# Patient Record
Sex: MALE | Race: WHITE | Employment: FULL TIME | URBAN - METROPOLITAN AREA
[De-identification: names, ages, dates, MRNs, and addresses within clinical notes are randomized per-mention and may not be internally consistent; named-entity substitution may affect disease eponyms.]

---

## 2018-06-06 DIAGNOSIS — I25.10 CORONARY ARTERY DISEASE INVOLVING NATIVE CORONARY ARTERY OF NATIVE HEART WITHOUT ANGINA PECTORIS: Chronic | ICD-10-CM

## 2018-06-06 DIAGNOSIS — Z95.5 HISTORY OF CORONARY ARTERY STENT PLACEMENT: ICD-10-CM

## 2018-06-06 RX ORDER — METOPROLOL SUCCINATE 25 MG/1
25 TABLET, EXTENDED RELEASE ORAL DAILY
Qty: 90 TABLET | Refills: 3 | Status: SHIPPED | OUTPATIENT
Start: 2018-06-06 | End: 2019-06-08 | Stop reason: SDUPTHER

## 2018-07-22 ENCOUNTER — APPOINTMENT (OUTPATIENT)
Dept: ULTRASOUND IMAGING | Age: 63
DRG: 312 | End: 2018-07-22
Payer: COMMERCIAL

## 2018-07-22 ENCOUNTER — HOSPITAL ENCOUNTER (INPATIENT)
Age: 63
LOS: 1 days | Discharge: HOME | DRG: 312 | End: 2018-07-23
Attending: INTERNAL MEDICINE | Admitting: INTERNAL MEDICINE
Payer: COMMERCIAL

## 2018-07-22 ENCOUNTER — APPOINTMENT (OUTPATIENT)
Dept: CT IMAGING | Age: 63
DRG: 312 | End: 2018-07-22
Payer: COMMERCIAL

## 2018-07-22 DIAGNOSIS — R55 SYNCOPE AND COLLAPSE: Primary | ICD-10-CM

## 2018-07-22 LAB
ANION GAP SERPL CALCULATED.3IONS-SCNC: 11 MMOL/L (ref 7–16)
BASOPHILS ABSOLUTE: 0.04 E9/L (ref 0–0.2)
BASOPHILS RELATIVE PERCENT: 0.3 % (ref 0–2)
BILIRUBIN URINE: NEGATIVE
BLOOD, URINE: NEGATIVE
BUN BLDV-MCNC: 15 MG/DL (ref 8–23)
CALCIUM SERPL-MCNC: 8.8 MG/DL (ref 8.6–10.2)
CHLORIDE BLD-SCNC: 102 MMOL/L (ref 98–107)
CLARITY: CLEAR
CO2: 26 MMOL/L (ref 22–29)
COLOR: YELLOW
CREAT SERPL-MCNC: 0.9 MG/DL (ref 0.7–1.2)
EOSINOPHILS ABSOLUTE: 0.12 E9/L (ref 0.05–0.5)
EOSINOPHILS RELATIVE PERCENT: 1 % (ref 0–6)
GFR AFRICAN AMERICAN: >60
GFR NON-AFRICAN AMERICAN: >60 ML/MIN/1.73
GLUCOSE BLD-MCNC: 121 MG/DL (ref 74–109)
GLUCOSE URINE: NEGATIVE MG/DL
HCT VFR BLD CALC: 41.3 % (ref 37–54)
HEMOGLOBIN: 14.2 G/DL (ref 12.5–16.5)
IMMATURE GRANULOCYTES #: 0.05 E9/L
IMMATURE GRANULOCYTES %: 0.4 % (ref 0–5)
KETONES, URINE: NEGATIVE MG/DL
LACTIC ACID: 1 MMOL/L (ref 0.5–2.2)
LEUKOCYTE ESTERASE, URINE: NEGATIVE
LYMPHOCYTES ABSOLUTE: 0.89 E9/L (ref 1.5–4)
LYMPHOCYTES RELATIVE PERCENT: 7.4 % (ref 20–42)
MCH RBC QN AUTO: 29.9 PG (ref 26–35)
MCHC RBC AUTO-ENTMCNC: 34.4 % (ref 32–34.5)
MCV RBC AUTO: 86.9 FL (ref 80–99.9)
MONOCYTES ABSOLUTE: 0.76 E9/L (ref 0.1–0.95)
MONOCYTES RELATIVE PERCENT: 6.4 % (ref 2–12)
NEUTROPHILS ABSOLUTE: 10.1 E9/L (ref 1.8–7.3)
NEUTROPHILS RELATIVE PERCENT: 84.5 % (ref 43–80)
NITRITE, URINE: NEGATIVE
PDW BLD-RTO: 13.6 FL (ref 11.5–15)
PH UA: 7 (ref 5–9)
PLATELET # BLD: 315 E9/L (ref 130–450)
PMV BLD AUTO: 10.2 FL (ref 7–12)
POTASSIUM REFLEX MAGNESIUM: 4.3 MMOL/L (ref 3.5–5)
PROTEIN UA: NEGATIVE MG/DL
RBC # BLD: 4.75 E12/L (ref 3.8–5.8)
SODIUM BLD-SCNC: 139 MMOL/L (ref 132–146)
SPECIFIC GRAVITY UA: 1.01 (ref 1–1.03)
TROPONIN: <0.01 NG/ML (ref 0–0.03)
UROBILINOGEN, URINE: 0.2 E.U./DL
WBC # BLD: 12 E9/L (ref 4.5–11.5)

## 2018-07-22 PROCEDURE — 99285 EMERGENCY DEPT VISIT HI MDM: CPT

## 2018-07-22 PROCEDURE — 6360000002 HC RX W HCPCS: Performed by: INTERNAL MEDICINE

## 2018-07-22 PROCEDURE — 80048 BASIC METABOLIC PNL TOTAL CA: CPT

## 2018-07-22 PROCEDURE — 85025 COMPLETE CBC W/AUTO DIFF WBC: CPT

## 2018-07-22 PROCEDURE — 2580000003 HC RX 258: Performed by: INTERNAL MEDICINE

## 2018-07-22 PROCEDURE — 83605 ASSAY OF LACTIC ACID: CPT

## 2018-07-22 PROCEDURE — 2060000000 HC ICU INTERMEDIATE R&B

## 2018-07-22 PROCEDURE — 70450 CT HEAD/BRAIN W/O DYE: CPT

## 2018-07-22 PROCEDURE — 74176 CT ABD & PELVIS W/O CONTRAST: CPT

## 2018-07-22 PROCEDURE — 81003 URINALYSIS AUTO W/O SCOPE: CPT

## 2018-07-22 PROCEDURE — 93880 EXTRACRANIAL BILAT STUDY: CPT

## 2018-07-22 PROCEDURE — 2580000003 HC RX 258: Performed by: EMERGENCY MEDICINE

## 2018-07-22 PROCEDURE — 84484 ASSAY OF TROPONIN QUANT: CPT

## 2018-07-22 RX ORDER — SODIUM CHLORIDE 0.9 % (FLUSH) 0.9 %
10 SYRINGE (ML) INJECTION EVERY 12 HOURS SCHEDULED
Status: DISCONTINUED | OUTPATIENT
Start: 2018-07-22 | End: 2018-07-22 | Stop reason: SDUPTHER

## 2018-07-22 RX ORDER — SODIUM CHLORIDE 0.9 % (FLUSH) 0.9 %
10 SYRINGE (ML) INJECTION PRN
Status: DISCONTINUED | OUTPATIENT
Start: 2018-07-22 | End: 2018-07-23 | Stop reason: HOSPADM

## 2018-07-22 RX ORDER — ONDANSETRON 2 MG/ML
4 INJECTION INTRAMUSCULAR; INTRAVENOUS EVERY 6 HOURS PRN
Status: DISCONTINUED | OUTPATIENT
Start: 2018-07-22 | End: 2018-07-23 | Stop reason: HOSPADM

## 2018-07-22 RX ORDER — 0.9 % SODIUM CHLORIDE 0.9 %
1000 INTRAVENOUS SOLUTION INTRAVENOUS ONCE
Status: COMPLETED | OUTPATIENT
Start: 2018-07-22 | End: 2018-07-22

## 2018-07-22 RX ORDER — ACETAMINOPHEN 325 MG/1
650 TABLET ORAL EVERY 4 HOURS PRN
Status: DISCONTINUED | OUTPATIENT
Start: 2018-07-22 | End: 2018-07-23 | Stop reason: HOSPADM

## 2018-07-22 RX ORDER — ACETAMINOPHEN 325 MG/1
650 TABLET ORAL EVERY 4 HOURS PRN
Status: DISCONTINUED | OUTPATIENT
Start: 2018-07-22 | End: 2018-07-22 | Stop reason: SDUPTHER

## 2018-07-22 RX ORDER — SODIUM CHLORIDE 0.9 % (FLUSH) 0.9 %
10 SYRINGE (ML) INJECTION PRN
Status: DISCONTINUED | OUTPATIENT
Start: 2018-07-22 | End: 2018-07-22 | Stop reason: SDUPTHER

## 2018-07-22 RX ORDER — SODIUM CHLORIDE 9 MG/ML
INJECTION, SOLUTION INTRAVENOUS CONTINUOUS
Status: DISCONTINUED | OUTPATIENT
Start: 2018-07-22 | End: 2018-07-23

## 2018-07-22 RX ORDER — OXYCODONE AND ACETAMINOPHEN 10; 325 MG/1; MG/1
1 TABLET ORAL EVERY 6 HOURS PRN
Status: DISCONTINUED | OUTPATIENT
Start: 2018-07-22 | End: 2018-07-23 | Stop reason: HOSPADM

## 2018-07-22 RX ORDER — ASPIRIN 81 MG/1
81 TABLET, CHEWABLE ORAL DAILY
Status: DISCONTINUED | OUTPATIENT
Start: 2018-07-23 | End: 2018-07-23 | Stop reason: HOSPADM

## 2018-07-22 RX ORDER — METOPROLOL SUCCINATE 25 MG/1
25 TABLET, EXTENDED RELEASE ORAL DAILY
Status: DISCONTINUED | OUTPATIENT
Start: 2018-07-23 | End: 2018-07-23 | Stop reason: HOSPADM

## 2018-07-22 RX ORDER — SODIUM CHLORIDE 0.9 % (FLUSH) 0.9 %
10 SYRINGE (ML) INJECTION EVERY 12 HOURS SCHEDULED
Status: DISCONTINUED | OUTPATIENT
Start: 2018-07-22 | End: 2018-07-23 | Stop reason: HOSPADM

## 2018-07-22 RX ADMIN — Medication 10 ML: at 22:21

## 2018-07-22 RX ADMIN — SODIUM CHLORIDE 1000 ML: 9 INJECTION, SOLUTION INTRAVENOUS at 14:47

## 2018-07-22 RX ADMIN — SODIUM CHLORIDE: 9 INJECTION, SOLUTION INTRAVENOUS at 22:20

## 2018-07-22 RX ADMIN — ENOXAPARIN SODIUM 40 MG: 40 INJECTION, SOLUTION INTRAVENOUS; SUBCUTANEOUS at 22:20

## 2018-07-22 ASSESSMENT — ENCOUNTER SYMPTOMS
COUGH: 0
SINUS PAIN: 0
VISUAL CHANGE: 0
BACK PAIN: 1
SINUS PRESSURE: 0
DIFFICULTY BREATHING: 0
VOMITING: 0
SHORTNESS OF BREATH: 0
RECTAL BLEEDING: 0
NAUSEA: 0

## 2018-07-22 ASSESSMENT — PAIN SCALES - GENERAL: PAINLEVEL_OUTOF10: 0

## 2018-07-22 NOTE — ED PROVIDER NOTES
urinating. Musculoskeletal: Positive for back pain. Negative for neck pain and neck stiffness. Skin: Negative for pallor. Neurological: Positive for syncope, light-headedness and headaches. Negative for focal weakness, seizures and speech difficulty. Psychiatric/Behavioral: Negative for confusion and decreased concentration. Physical Exam   Constitutional: He is oriented to person, place, and time. He appears well-developed and well-nourished. No distress. HENT:   Head: Normocephalic and atraumatic. Right Ear: External ear normal.   Left Ear: External ear normal.   Eyes: Conjunctivae are normal. Pupils are equal, round, and reactive to light. Neck: Neck supple. Cardiovascular: Normal rate, regular rhythm and intact distal pulses. Murmur heard. Pulses:       Radial pulses are 2+ on the right side, and 2+ on the left side. Dorsalis pedis pulses are 2+ on the right side, and 2+ on the left side. Pulmonary/Chest: Effort normal and breath sounds normal. No respiratory distress. He has no wheezes. Abdominal: Soft. Bowel sounds are normal. There is no tenderness. Musculoskeletal: He exhibits no edema. Neurological: He is alert and oriented to person, place, and time. GCS eye subscore is 4. GCS verbal subscore is 5. GCS motor subscore is 6. Patient is equal  strength. Patient able to perform elbow flexion and extension against resistance. Patient able to perform hip flexion, knee extension, plantar flexion, dorsiflexion and extension for hallicus longus muscle against resistance. She denies any numbness or tingling sensation on all for tremors. Skin: Skin is warm and dry. He is not diaphoretic. Psychiatric: He has a normal mood and affect. Nursing note and vitals reviewed. Procedures    MDM  Number of Diagnoses or Management Options  Syncope and collapse:   Diagnosis management comments: Patient presents to the ED for syncopal episode.  The patient was evaluated for intracranial process, dysrhythmia and electrolyte site deficits. EKG and blood work unremarkable. CT scan of the head unremarkable. CT scan of the abdomen did not demonstrate an AAA. Patient's headache subsided prior to arrival due to his intermittent description of the headache, we believe is less likely a subarachnoid hemorrhage. Workup in the ED revealed unknown etiology for patient's syncope and collapse. With this in mind as well as given his TIA and cardiac history, he warrants further evaluation and management with admission. Patient requires continued workup and management of their symptoms and will be admitted to the hospital for further evaluation and treatment.          --------------------------------------------- PAST HISTORY ---------------------------------------------  Past Medical History:  has a past medical history of CAD (coronary artery disease); Coronary artery disease; Hyperlipidemia LDL goal < 100; and Pulmonary asbestosis ([facility] [address] Past Surgical History:  has a past surgical history that includes knee surgery (years ago); shoulder surgery (years ago); back surgery; and Cardiac catheterization (8/6/2013). Social History:  reports that he has never smoked. He uses smokeless tobacco. He reports that he drinks about 2.4 oz of alcohol per week . He reports that he does not use drugs. Family History: family history includes Heart Disease (age of onset: 54) in his father. The patients home medications have been reviewed. Allergies: Patient has no known allergies.     -------------------------------------------------- RESULTS -------------------------------------------------    Lab  Results for orders placed or performed during the hospital encounter of 07/22/18   CBC auto differential   Result Value Ref Range    WBC 12.0 (H) 4.5 - 11.5 E9/L    RBC 4.75 3.80 - 5.80 E12/L    Hemoglobin 14.2 12.5 - 16.5 g/dL    Hematocrit 41.3 37.0 - 54.0 %    MCV 86.9 80.0 - 99.9 fL    MCH 29.9 26.0 -

## 2018-07-22 NOTE — ED NOTES
Pt SBAR faxed; Floor contacted to verify receipt of faxed transmission.  Pt ready for transport     Sarahy Johansen RN  07/22/18 8721

## 2018-07-23 VITALS
BODY MASS INDEX: 32.07 KG/M2 | HEART RATE: 76 BPM | TEMPERATURE: 97.7 F | WEIGHT: 236.8 LBS | OXYGEN SATURATION: 95 % | SYSTOLIC BLOOD PRESSURE: 150 MMHG | RESPIRATION RATE: 14 BRPM | DIASTOLIC BLOOD PRESSURE: 81 MMHG | HEIGHT: 72 IN

## 2018-07-23 DIAGNOSIS — R55 SYNCOPE, UNSPECIFIED SYNCOPE TYPE: Primary | ICD-10-CM

## 2018-07-23 LAB
LV EF: 65 %
LVEF MODALITY: NORMAL

## 2018-07-23 PROCEDURE — 93306 TTE W/DOPPLER COMPLETE: CPT

## 2018-07-23 PROCEDURE — 6370000000 HC RX 637 (ALT 250 FOR IP): Performed by: INTERNAL MEDICINE

## 2018-07-23 PROCEDURE — 99254 IP/OBS CNSLTJ NEW/EST MOD 60: CPT | Performed by: INTERNAL MEDICINE

## 2018-07-23 RX ADMIN — ACETAMINOPHEN 650 MG: 325 TABLET ORAL at 11:04

## 2018-07-23 RX ADMIN — METOPROLOL SUCCINATE 25 MG: 25 TABLET, EXTENDED RELEASE ORAL at 08:16

## 2018-07-23 RX ADMIN — ASPIRIN 81 MG CHEWABLE TABLET 81 MG: 81 TABLET CHEWABLE at 08:16

## 2018-07-23 ASSESSMENT — PAIN SCALES - GENERAL: PAINLEVEL_OUTOF10: 3

## 2018-07-23 NOTE — H&P
pain    Allergies:    Patient has no known allergies. Social History:    reports that he has never smoked. His smokeless tobacco use includes Chew. He reports that he drinks about 2.4 oz of alcohol per week . He reports that he does not use drugs. Family History:   family history includes Heart Disease (age of onset: 54) in his father. REVIEW OF SYSTEMS    Constitutional: negative for chills and fevers  Eyes: negative for icterus and redness  Ears, nose, mouth, throat, and face: negative for epistaxis, hearing loss, nasal congestion, sore mouth, sore throat and tinnitus  Respiratory: negative for cough and shortness of breath  Cardiovascular: negative for chest pain and palpitations  Gastrointestinal: negative for abdominal pain, nausea and vomiting  Genitourinary:negative for dysuria and frequency  Integument/breast: negative for pruritus and rash  Hematologic/lymphatic: negative for bleeding and easy bruising  Musculoskeletal:negative for arthralgias and back pain  Neurological: negative for coordination problems and dizziness  Behavioral/Psych: negative for anxiety and depression  Endocrine: negative for temperature intolerance  Allergic/Immunologic: negative for anaphylaxis and angioedema    PHYSICAL EXAM:    Vitals:  BP (!) 150/81   Pulse 76   Temp 97.7 °F (36.5 °C) (Oral)   Resp 14   Ht 6' (1.829 m)   Wt 236 lb 12.8 oz (107.4 kg)   SpO2 95%   BMI 32.12 kg/m²     General appearance: alert, appears stated age and cooperative  Head: Normocephalic, without obvious abnormality, atraumatic  Eyes: conjunctivae/corneas clear. PERRL, EOM's intact. Fundi benign. Ears: normal TM's and external ear canals both ears  Nose: Nares normal. Septum midline. Mucosa normal. No drainage or sinus tenderness.   Throat: lips, mucosa, and tongue normal; teeth and gums normal  Neck: no adenopathy, no carotid bruit, no JVD, supple, symmetrical, trachea midline and thyroid not enlarged, symmetric, no Nitrite, Urine Latest Ref Range: Negative  Negative   Leukocyte Esterase, Urine Latest Ref Range: Negative  Negative   Results for Kymberly Lisa (MRN [id number]) as of 2018 11:18   Ref. Range 2018 15:16   Lactic Acid Latest Ref Range: 0.5 - 2.2 mmol/L 1.0     Narrative   Patient MRN:  [id number]   : 1955   Age: 58 years   Gender: Male   Order Date:  2018 2:45 PM       EXAM: CT ABDOMEN PELVIS WO CONTRAST       NUMBER OF IMAGES \ views:       INDICATION: No abdominal complaints, screening for abdominal aortic   aneurysm in patient with coronary artery disease and hyperlipidemia       COMPARISON: None       TECHNIQUE:   Axial computerized tomography sections of the abdomen with sagittal   and coronal MPR reconstructions were obtained from the lower chest to   the pelvic floor. Reformatted coronal and sagittal images were   provided for review.  Low-dose CT  acquisition technique included one   of following options; 1 . Automated exposure control, 2. Adjustment of   MA and or KV according to patient's size or 3. Use of iterative   reconstruction.       FINDINGS:   Noncontrast imaging was performed.       CHEST:   The lung bases are remarkable for diffuse pleural calcification in the   lower lungs bilaterally, suggesting asbestos exposure. This also   involves both hemidiaphragmatic contours. There is no evidence of   pleural effusion, mass lesions in the included lungs, and there is   extensive coronary artery calcification without evidence of cardiac   enlargement or decompensation. Romeo Alberts LIVER:   The liver is uniform in density with no evidence of lesions, biliary   ectasia, or gallbladder pathology.          SPLEEN:unremarkable.       ADRENALS:  unremarkable.       PANCREAS:unremarkable.        KIDNEYS/BLADDER: Noncontrast imaging shows limited detail, but there   is no evidence of collecting system calcification, prominent   perinephric inflammatory change, or obstructive dilation.  Bladder contours are normal.        APPENDIX is normal in appearance       BOWEL:  Shows no evidence of obstruction, perforation, or focal   inflammatory change.       PELVIS: There is no pelvic free fluid or adenopathy.           LYMPHATICS: There is no evidence of retroperitoneal or mesenteric   adenopathy.       VASCULAR: The proximal abdominal aorta measures 2.5 cm diameter, and   the mid and distal aorta measure 1.8 cm diameter. Iliac vessels are   not ectatic, with diameter measurements in the 11 mm range   bilaterally. Abdominal aortic branch vessels are normal in appearance. Atherosclerotic plaque is noted in the infrarenal abdominal aorta.       SKELETAL: Disc space narrowing and degenerative hypertrophic vertebral   changes are noted in the L3 through the S1 level. No acute findings   are evident.           Impression       1. Normal appearance of the lower thoracic and abdominal aorta through   the iliac vessels and proximal femoral arteries. There is some   calcified plaque in the abdominal aorta below the renal arteries. 2. There may be a small retrocardiac hiatus hernia. 3. There is extensive calcified plaque in the pleural surfaces of both   hemithoraces, with some thickening of plaque but no evidence of a   discrete pulmonary mass, pleural effusion, or localized pleural   thickening to suggest acute complications.    4. Degenerative changes of the spine are incidentally noted.                 7/22/2018  4:50 PM - Hieu, Mhy Incoming Ekg Results From [facility]     Component Results     Component Value Ref Range & Units Status Collected Lab   Ventricular Rate 74  BPM Incomplete 07/22/2018  3:24 PM [facility]   Atrial Rate 74  BPM Incomplete 07/22/2018  3:24 PM [facility]   P-R Interval 178  ms Incomplete 07/22/2018  3:24 PM [facility]   QRS Duration 110  ms Incomplete 07/22/2018  3:24 PM [facility]   Q-T Interval 378  ms Incomplete 07/22/2018  3:24 PM [facility]   QTc Calculation (Bazett) 419  ms Incomplete 07/22/2018     Clinical statement: 58years old Male with syncope.       Technique: Multiplanar grayscale, color Doppler, and pulsed-wave   images are obtained of the bilateral common carotid, external cranial   internal carotid, external carotid, and proximal vertebral arteries on   7/22/2018 8:00 PM.        Comparison: No prior studies available for comparison.       Findings:    Right: Grayscale evaluation demonstrates fairly smooth contour of the   right common carotid artery with mild mural irregularity of the   proximal internal carotid artery and minimal diffuse calcified   atherosclerotic plaque deposition. Antegrade flow is demonstrated in   all visualized right-sided vessels with grossly normal pulse waves. The peak systolic and end-diastolic velocities are as follows:       Right CCA = 74 cm/s (PSV) and 13 cm/s (EDV)    Right ICA proximal = 95 cm/s (PSV) and 20 cm/s (EDV)    Right ICA mid = 66 cm/s (PSV) and 20 cm/s (EDV)    Right ICA distal = 40 cm/s (PSV) and 11 cm/s (EDV)    Right ECA = 138 cm/s (PSV) and 22 cm/s (EDV)    Right vertebral artery = 62 cm/s (PSV) and 14 cm/s (EDV)    ICA/CCA PSV ratio: 1.3       Left: Grayscale evaluation demonstrates fairly smooth contour of the   left common carotid artery with mild mural irregularity of the   proximal internal carotid artery and minimal diffuse calcified   atherosclerotic plaque deposition. There are no significant stenoses   demonstrated. Antegrade flow is demonstrated in all visualized   left-sided vessels with grossly normal pulse waves.  The peak systolic   and end-diastolic velocities are as follows:       Left CCA = 85 cm/s (PSV) and 17 cm/s (EDV)    Left ICA proximal = 83 cm/s (PSV) and 27 cm/s (EDV)    Left ICA mid = 70 cm/s (PSV) and 20 cm/s (EDV)    Left ICA distal = 96 cm/s (PSV) and 35 cm/s (EDV)    Left ECA = 111 cm/s (PSV) and 9 cm/s (EDV)    Left vertebral artery = 62 cm/s (PSV) and 17 cm/s (EDV)    ICA/CCA PSV ratio: 1.1       PSV > 230 cm/s corresponds to stenosis >70%, > 125 cm/s corresponds to   stenosis > 50%       EDV  cm/s corresponds to stenosis of 50-69%, 110-140 cm/s   corresponds to stenosis of 70-79% , > 140 cm/s corresponds to stenosis   > 80%       ICA/CCA PSV ratio > 4 corresponds to stenosis of greater than 70%,   3.2-4 corresponds to stenosis of 50-69%           Impression   1. Carotid Doppler ultrasound demonstrating a hemodynamically   significant stenosis (50-69%) in the right external carotid artery   based on elevated peak systolic velocity, as above. 2. No evidence of hemodynamically significant stenosis involving the   remaining carotid or vertebral arteries bilaterally, with peak   systolic and end-diastolic velocities as above. Problem list:    Patient Active Problem List   Diagnosis    Pulmonary asbestosis ([address]    Coronary artery disease    Cerebral infarction ([address]    Hyperlipidemia with target LDL less than 100    History of coronary artery stent placement    Syncope and collapse         ASSESSMENT:      1. Syncope, likely vasodepressor    2. Pulmonary asbestosis    3. CAD    4. History of TIA    5. Hyperlipidemia    PLAN:     1. Echocardiogram completed, results pending    2. Consult cardiology    3.  Pending echocardiogram results, possibly home with outpatient monitor    Bella Kerr D.O.  11:27 AM  7/23/2018

## 2018-07-23 NOTE — PROGRESS NOTES
Dr. Debra Portillo with [facility] called, ok to discharge from their service.   Electronically signed by Giovanny Zavala RN on 7/23/2018 at 2:39 PM

## 2018-07-23 NOTE — CONSULTS
CHIEF COMPLAINT: Syncope    HISTORY OF PRESENT ILLNESS: Patient is a 58 y.o. male seen at the request of Kelly Kuo MD and followed at our office by Dr. Mario Galindo.    Patient presents with syncope. [facility] and started to feel lightheaded and had a headache. He was driving and had worsening lightheadedness. He pulled over and passed out. He was cold and diaphoretic. No CP or SOB. Patient has history of prior MI. Past Medical History:   Diagnosis Date    CAD (coronary artery disease)     Coronary artery disease 8/28/2013    70% LAD on cath 8/6/13 s/p 3.5 X 18 mm Resolute drug-eluting stent to proximal-mid LAD.   Mild luminal irreg in RCA and CX    Hyperlipidemia LDL goal < 100 6/5/2014    Pulmonary asbestosis ([facility] [address]        Patient Active Problem List   Diagnosis    Pulmonary asbestosis ([facility] [address]    Coronary artery disease    Cerebral infarction ([facility] [address]    Hyperlipidemia with target LDL less than 100    History of coronary artery stent placement    Syncope and collapse       No Known Allergies    Current Facility-Administered Medications   Medication Dose Route Frequency Provider Last Rate Last Dose    aspirin chewable tablet 81 mg  81 mg Oral Daily Blaire Prasad, DO   81 mg at 07/23/18 0816    metoprolol succinate (TOPROL XL) extended release tablet 25 mg  25 mg Oral Daily Blaire Prasad, DO   25 mg at 07/23/18 0816    oxyCODONE-acetaminophen (PERCOCET)  MG per tablet 1 tablet  1 tablet Oral Q6H PRN Blaire Prasad DO        sodium chloride flush 0.9 % injection 10 mL  10 mL Intravenous 2 times per day Blaire Prasad, DO   10 mL at 07/22/18 2221    sodium chloride flush 0.9 % injection 10 mL  10 mL Intravenous PRN Blaire Prasad, DO        magnesium hydroxide (MILK OF MAGNESIA) 400 MG/5ML suspension 30 mL  30 mL Oral Daily PRN Blaire Prasad, DO        ondansetron [facility] injection 4 mg  4 mg Intravenous Q6H PRN Blaire Prasad DO        enoxaparin (LOVENOX) injection 40 mg  40 mg Subcutaneous Daily Wilner Room Malmer, DO   40 mg at 07/22/18 2220    0.9 % sodium chloride infusion   Intravenous Continuous Wilner Room Malmer, DO 75 mL/hr at 07/22/18 2220      acetaminophen (TYLENOL) tablet 650 mg  650 mg Oral Q4H PRN Wilner Room Malmer, DO           Social History     Social History    Marital status:      Spouse name: N/A    Number of children: N/A    Years of education: N/A     Occupational History     [employer]     Social History Main Topics    Smoking status: Never Smoker    Smokeless tobacco: Current User     Types: Chew    Alcohol use 2.4 oz/week     4 Cans of beer per week      Comment: 4 beers per day    Drug use: No    Sexual activity: Not on file     Other Topics Concern    Not on file     Social History Narrative    No narrative on file       Family History   Problem Relation Age of Onset    Heart Disease Father 54        CABG       Review of Systems:   Heart: as above   Lungs: as above   Eyes: denies changes in vision or discharge. Ears: denies changes in hearing or pain. Nose: denies epistaxis or masses   Throat: denies sore throat or trouble swallowing. Neuro: denies numbness, tingling, tremors. Skin: denies rashes or itching. : denies hematuria, dysuria   GI: denies vomiting, diarrhea   Psych: denies mood changed, anxiety, depression. all others negative. Physical Exam   BP (!) 150/81   Pulse 76   Temp 97.7 °F (36.5 °C) (Oral)   Resp 14   Ht 6' (1.829 m)   Wt 236 lb 12.8 oz (107.4 kg)   SpO2 95%   BMI 32.12 kg/m²   Constitutional: Oriented to person, place, and time. Well-developed and well-nourished. No distress. Head: Normocephalic and atraumatic. Eyes: EOM are normal. Pupils are equal, round, and reactive to light. Neck: Normal range of motion. Neck supple. No hepatojugular reflux and no JVD present. Carotid bruit is not present. No tracheal deviation present. No thyromegaly present. Cardiovascular: Normal rate, regular rhythm, normal heart sounds and intact distal pulses. Exam reveals no gallop and no friction rub. No murmur heard. Pulmonary/Chest: Effort normal and breath sounds normal. No respiratory distress. No wheezes. No rales. No tenderness. Abdominal: Soft. Bowel sounds are normal. No distension and no mass. No tenderness. No rebound and no guarding. Musculoskeletal: Normal range of motion. No edema and no tenderness. Lymphadenopathy:   No cervical adenopathy. No groin adenopathy. Neurological: Alert and oriented to person, place, and time. Skin: Skin is warm and dry. No rash noted. Not diaphoretic. No erythema. Psychiatric: Normal mood and affect. Behavior is normal.       CBC:   Lab Results   Component Value Date    WBC 12.0 07/22/2018    RBC 4.75 07/22/2018    HGB 14.2 07/22/2018    HCT 41.3 07/22/2018    MCV 86.9 07/22/2018    MCH 29.9 07/22/2018    MCHC 34.4 07/22/2018    RDW 13.6 07/22/2018     07/22/2018    MPV 10.2 07/22/2018     BMP:   Lab Results   Component Value Date     07/22/2018    K 4.3 07/22/2018     07/22/2018    CO2 26 07/22/2018    BUN 15 07/22/2018    CREATININE 0.9 07/22/2018    CALCIUM 8.8 07/22/2018    GFRAA >60 07/22/2018    LABGLOM >60 07/22/2018     Magnesium:  No results found for: MG  Cardiac Enzymes:   Lab Results   Component Value Date    CKTOTAL 137 01/15/2014    TROPONINI <0.01 07/22/2018    TROPONINI <0.01 06/04/2014      PT/INR:    Lab Results   Component Value Date    PROTIME 11.6 08/05/2013    INR 1.0 08/05/2013     TSH:  No results found for: TSH    Rhythm Strip: normal sinus rhythm. EKG:  normal sinus rhythm, nonspecific ST and T waves changes, left axis deviation.     PRIOR CARDIAC TESTING:  Stress   -6/19/13: regadenoson, normal perfusion, EF 66%  -1/17/14: exercise, normal perfusion, EF 78%  Echo:   -8/5/13: EF 60-65%, stage I DD, trace MR/TR   LHC:   -8/6/13: Massiel Colon) EF 65%, LM mildly calcified, no stenosis; LAD mod calcification, 70% prox-mid LAD stenosis; CX mild diffuse luminal irreg; RCA mild diffuse luminal irreg, mildly calcified, right dominant. S/P PCI prox-mid LAD drug-eluting stent    ASSESSMENT AND PLAN:  Patient Active Problem List   Diagnosis    Pulmonary [facility]osis ([facility] [address]    Coronary artery disease    Cerebral infarction ([facility] [address]    Hyperlipidemia with target LDL less than 100    History of coronary artery stent placement    Syncope and collapse     1. Syncope: EKG is benign. Monitor to this point okay. Carotids no internal carotid or vertebral disease of significance. Echo. If echo okay then discharge with 30 day monitor and outpatient follow up. 2. CAD s/p prox-mid LAD stent (drug-eluting) 8/6/13:     Continue ASA and metoprolol. Statin intolerance. 3. History of pulmonary asbestosis    4. History of CVA: On ASA. Intolerant to statin. Carie Flood D.O.   Cardiologist  Cardiology, [facility]

## 2018-07-23 NOTE — DISCHARGE SUMMARY
Physician Discharge Summary     Patient ID:  Fermin Erickson  [id number]  70 y.o.  1955    Admit date: 7/22/2018    Discharge date and time: 7/23/2018  3:44 PM     Admission Diagnoses: Syncope and collapse [R55]  Syncope and collapse [R55]    Discharge Diagnoses:        1. Syncope, likely vasodepressor     2. Pulmonary asbestosis     3. CAD     4. History of TIA     5. Hyperlipidemia    Consults: cardiology    Procedures: Echo Complete   Order: [id number]   Status:  Edited Result - FINAL   Visible to patient:  No (Not Released) Next appt:  None   Details     Reading Physician Reading Date Result Priority   Chidi Whitehead DO 7/23/2018    Narrative     Transthoracic Echocardiography Report (TTE)     Demographics      Patient Name   Verlinda  Gender            Male                   R      Medical Record  [id number]      Room Number       0602   Number      Account #       [de-identified]     Procedure Date    07/23/2018      Corporate ID                  Ordering         Elijah Wilkins DO                                 Physician      Accession       [id number]     Referring         Juno Guillen  [facility]                        Physician      Date of Birth   1955    Sonographer       Conjunct [facility]ett RDCS      Age             62 year(s)    Interpreting     [address]                                 Physician         Physician Cardiology                                                   Elijah Wilkins DO                                    Any Other     Procedure    Type of Study      TTE procedure:Echo Complete W/Doppler & Color Flow.     Procedure Date  Date: 07/23/2018 Start: 10:15 AM    Study Location: Echo Lab  Technical Quality: Adequate visualization    Indications:Syncope. Patient Status: Routine    Height: 72 inches Weight: 237 pounds BSA: 2.29 m^2 BMI: 32.14 kg/m^2    HR: 73 bpm BP: 150/81 mmHg     Findings      Left Ventricle   Ejection fraction is visually estimated at 65%.  No regional wall

## 2018-07-24 ENCOUNTER — TELEPHONE (OUTPATIENT)
Dept: CARDIOLOGY CLINIC | Age: 63
End: 2018-07-24

## 2018-07-24 NOTE — TELEPHONE ENCOUNTER
----- Message from Christie Davey DO sent at 7/23/2018  2:37 PM EDT -----  Please arrange 30 day monitor and f/u    Christie Davey D.O.   Cardiologist  Cardiology, [id number]

## 2018-08-02 LAB
EKG ATRIAL RATE: 74 BPM
EKG P AXIS: 50 DEGREES
EKG P-R INTERVAL: 178 MS
EKG Q-T INTERVAL: 378 MS
EKG QRS DURATION: 110 MS
EKG QTC CALCULATION (BAZETT): 419 MS
EKG R AXIS: -32 DEGREES
EKG T AXIS: 16 DEGREES
EKG VENTRICULAR RATE: 74 BPM

## 2018-08-20 ENCOUNTER — TELEPHONE (OUTPATIENT)
Dept: CARDIOLOGY CLINIC | Age: 63
End: 2018-08-20

## 2018-08-20 NOTE — TELEPHONE ENCOUNTER
Mr. Cristiana Brown called back. I asked him if he was wearing his cardionet monitor still. He states he had nothing but problems with it, so he returned it. I called Moy Matos from [place] [id number] and she states they have been leaving him messages and have no return. She was given the phone number that he called on today. She will have new monitor sent to him and see if we can get some recordings,  His apt has not been made yet as his monitor is not done. He was called and given the information that he would be receiving a new monitor and to be expecting it.   He was also told that they were given his [phone] number to reach him

## 2018-09-04 DIAGNOSIS — R55 SYNCOPE, UNSPECIFIED SYNCOPE TYPE: ICD-10-CM

## 2019-06-08 DIAGNOSIS — Z95.5 HISTORY OF CORONARY ARTERY STENT PLACEMENT: ICD-10-CM

## 2019-06-08 DIAGNOSIS — I25.10 CORONARY ARTERY DISEASE INVOLVING NATIVE CORONARY ARTERY OF NATIVE HEART WITHOUT ANGINA PECTORIS: Chronic | ICD-10-CM

## 2019-06-10 RX ORDER — METOPROLOL SUCCINATE 25 MG/1
TABLET, EXTENDED RELEASE ORAL
Qty: 30 TABLET | Refills: 0 | Status: SHIPPED | OUTPATIENT
Start: 2019-06-10 | End: 2019-10-04 | Stop reason: SDUPTHER

## 2019-10-04 ENCOUNTER — OFFICE VISIT (OUTPATIENT)
Dept: CARDIOLOGY CLINIC | Age: 64
End: 2019-10-04
Payer: COMMERCIAL

## 2019-10-04 VITALS
HEART RATE: 64 BPM | HEIGHT: 72 IN | BODY MASS INDEX: 31.15 KG/M2 | WEIGHT: 230 LBS | SYSTOLIC BLOOD PRESSURE: 132 MMHG | DIASTOLIC BLOOD PRESSURE: 80 MMHG

## 2019-10-04 DIAGNOSIS — R55 SYNCOPE AND COLLAPSE: ICD-10-CM

## 2019-10-04 DIAGNOSIS — I25.10 CORONARY ARTERY DISEASE INVOLVING NATIVE CORONARY ARTERY OF NATIVE HEART WITHOUT ANGINA PECTORIS: Primary | Chronic | ICD-10-CM

## 2019-10-04 DIAGNOSIS — Z95.5 HISTORY OF CORONARY ARTERY STENT PLACEMENT: ICD-10-CM

## 2019-10-04 PROCEDURE — 93000 ELECTROCARDIOGRAM COMPLETE: CPT | Performed by: INTERNAL MEDICINE

## 2019-10-04 PROCEDURE — 99213 OFFICE O/P EST LOW 20 MIN: CPT | Performed by: INTERNAL MEDICINE

## 2019-10-04 RX ORDER — METOPROLOL SUCCINATE 25 MG/1
TABLET, EXTENDED RELEASE ORAL
Qty: 90 TABLET | Refills: 4 | Status: SHIPPED
Start: 2019-10-04 | End: 2020-10-20

## 2020-02-21 ENCOUNTER — HOSPITAL ENCOUNTER (OUTPATIENT)
Age: 65
Discharge: HOME | End: 2020-02-23
Payer: COMMERCIAL

## 2020-02-21 ENCOUNTER — HOSPITAL ENCOUNTER (OUTPATIENT)
Dept: GENERAL RADIOLOGY | Age: 65
Discharge: HOME | End: 2020-02-23
Payer: COMMERCIAL

## 2020-02-21 PROCEDURE — 71046 X-RAY EXAM CHEST 2 VIEWS: CPT

## 2020-03-04 ENCOUNTER — HOSPITAL ENCOUNTER (OUTPATIENT)
Dept: CT IMAGING | Age: 65
Discharge: HOME | End: 2020-03-06
Payer: COMMERCIAL

## 2020-03-04 PROCEDURE — 6360000004 HC RX CONTRAST MEDICATION: Performed by: RADIOLOGY

## 2020-03-04 PROCEDURE — 71260 CT THORAX DX C+: CPT

## 2020-03-04 PROCEDURE — 2580000003 HC RX 258: Performed by: RADIOLOGY

## 2020-03-04 RX ORDER — SODIUM CHLORIDE 0.9 % (FLUSH) 0.9 %
10 SYRINGE (ML) INJECTION 2 TIMES DAILY
Status: DISCONTINUED | OUTPATIENT
Start: 2020-03-04 | End: 2020-03-07 | Stop reason: HOSPADM

## 2020-03-04 RX ADMIN — IOPAMIDOL 100 ML: 755 INJECTION, SOLUTION INTRAVENOUS at 09:14

## 2020-03-04 RX ADMIN — Medication 10 ML: at 09:14

## 2020-05-27 ENCOUNTER — TELEPHONE (OUTPATIENT)
Dept: CARDIOLOGY CLINIC | Age: 65
End: 2020-05-27

## 2020-06-10 ENCOUNTER — OFFICE VISIT (OUTPATIENT)
Dept: CARDIOLOGY CLINIC | Age: 65
End: 2020-06-10
Payer: COMMERCIAL

## 2020-06-10 VITALS
RESPIRATION RATE: 16 BRPM | WEIGHT: 228.9 LBS | HEART RATE: 90 BPM | TEMPERATURE: 97.2 F | SYSTOLIC BLOOD PRESSURE: 118 MMHG | DIASTOLIC BLOOD PRESSURE: 68 MMHG | BODY MASS INDEX: 31 KG/M2 | HEIGHT: 72 IN

## 2020-06-10 PROCEDURE — 99214 OFFICE O/P EST MOD 30 MIN: CPT | Performed by: INTERNAL MEDICINE

## 2020-06-10 PROCEDURE — 93000 ELECTROCARDIOGRAM COMPLETE: CPT | Performed by: INTERNAL MEDICINE

## 2020-06-11 ENCOUNTER — APPOINTMENT (OUTPATIENT)
Dept: GENERAL RADIOLOGY | Age: 65
End: 2020-06-11
Payer: COMMERCIAL

## 2020-06-11 ENCOUNTER — HOSPITAL ENCOUNTER (EMERGENCY)
Age: 65
Discharge: HOME | End: 2020-06-11
Payer: COMMERCIAL

## 2020-06-11 VITALS
TEMPERATURE: 98 F | SYSTOLIC BLOOD PRESSURE: 156 MMHG | HEIGHT: 72 IN | BODY MASS INDEX: 30.96 KG/M2 | OXYGEN SATURATION: 98 % | RESPIRATION RATE: 16 BRPM | WEIGHT: 228.56 LBS | DIASTOLIC BLOOD PRESSURE: 78 MMHG | HEART RATE: 68 BPM

## 2020-06-11 PROCEDURE — 6370000000 HC RX 637 (ALT 250 FOR IP): Performed by: NURSE PRACTITIONER

## 2020-06-11 PROCEDURE — 99283 EMERGENCY DEPT VISIT LOW MDM: CPT

## 2020-06-11 PROCEDURE — 96372 THER/PROPH/DIAG INJ SC/IM: CPT

## 2020-06-11 PROCEDURE — 72100 X-RAY EXAM L-S SPINE 2/3 VWS: CPT

## 2020-06-11 PROCEDURE — 6360000002 HC RX W HCPCS: Performed by: NURSE PRACTITIONER

## 2020-06-11 RX ORDER — OXYCODONE HYDROCHLORIDE 5 MG/1
10 TABLET ORAL ONCE
Status: COMPLETED | OUTPATIENT
Start: 2020-06-11 | End: 2020-06-11

## 2020-06-11 RX ORDER — ORPHENADRINE CITRATE 30 MG/ML
60 INJECTION INTRAMUSCULAR; INTRAVENOUS ONCE
Status: COMPLETED | OUTPATIENT
Start: 2020-06-11 | End: 2020-06-11

## 2020-06-11 RX ORDER — KETOROLAC TROMETHAMINE 30 MG/ML
30 INJECTION, SOLUTION INTRAMUSCULAR; INTRAVENOUS ONCE
Status: COMPLETED | OUTPATIENT
Start: 2020-06-11 | End: 2020-06-11

## 2020-06-11 RX ADMIN — ORPHENADRINE CITRATE 60 MG: 30 INJECTION INTRAMUSCULAR; INTRAVENOUS at 02:47

## 2020-06-11 RX ADMIN — KETOROLAC TROMETHAMINE 30 MG: 30 INJECTION, SOLUTION INTRAMUSCULAR at 02:47

## 2020-06-11 RX ADMIN — OXYCODONE HYDROCHLORIDE 10 MG: 5 TABLET ORAL at 02:47

## 2020-06-11 ASSESSMENT — PAIN DESCRIPTION - PAIN TYPE: TYPE: ACUTE PAIN

## 2020-06-11 ASSESSMENT — PAIN SCALES - GENERAL
PAINLEVEL_OUTOF10: 10
PAINLEVEL_OUTOF10: 10

## 2020-06-11 ASSESSMENT — PAIN DESCRIPTION - LOCATION: LOCATION: BACK;LEG

## 2020-06-11 ASSESSMENT — PAIN DESCRIPTION - ORIENTATION: ORIENTATION: LEFT

## 2020-06-11 NOTE — ED PROVIDER NOTES
[facility]     Department of Emergency Medicine   ED  Provider Note  Admit Date/RoomTime: 6/11/2020  2:15 AM  ED Room: 23/23  Chief Complaint:   Back Pain (lower back pain radiating into left leg x1 day gradually getting worse)    History of Present Illness   Source of history provided by:  patient. History/Exam Limitations: none. Beth Hawk is a 59 y.o. old male who has a past medical history of:   Past Medical History:   Diagnosis Date    CAD (coronary artery disease)     Coronary artery disease 8/28/2013    70% LAD on cath 8/6/13 s/p 3.5 X 18 mm Resolute drug-eluting stent to proximal-mid LAD. Mild luminal irreg in RCA and CX    Hyperlipidemia LDL goal < 100 6/5/2014    Pulmonary asbestosis ([address]     presents to the emergency department by ambulance where the patient received see Ambulance Run Sheet prior to arrival., for acute and chronic, sharp bilateral middle and lower lumbar spine pain without radiation, for 1 day(s) prior to arrival.  The pain was caused by no known injury. He has a history of previous spinal surgery. Since onset the symptoms have been gradually worsening and moderate to severe in severity. There has been no bowel or bladder incontinence associated with the pain. There have been associated symptoms of nothing additional and denies any weakness, numbness, incontinence, dysuria, hematuria, abdominal pain, fever, hx cancer, weight loss, recent steroid use, tingling, morning stiffness, leg pain, leg weakness, abdominal swelling, chest pain, pelvic pain or perianal numbness. The the pain is aggraveated by any movement and relieved by nothing. He is on Percocet chronically prescribed by his primary care physician. .  ROS   Pertinent positives and negatives are stated within HPI, all other systems reviewed and are negative.     Past Surgical History:   Procedure Laterality Date    BACK SURGERY      lumbar surgery approx 2003    CARDIAC CATHETERIZATION  8/6/2013 by Dr. Cassie Hadley, drug eluding stent to prox lad    KNEE SURGERY  years ago    right    SHOULDER SURGERY  years ago    left   Social History:  reports that he has never smoked. His smokeless tobacco use includes chew. He reports current alcohol use of about 4.0 standard drinks of alcohol per week. He reports that he does not use drugs. Family History: family history includes Heart Disease (age of onset: 54) in his father. Allergies: Patient has no known allergies. Physical Exam           ED Triage Vitals [06/11/20 0222]   BP Temp Temp Source Pulse Resp SpO2 Height Weight   (!) 173/73 98 °F (36.7 °C) Oral 72 16 95 % 6' (1.829 m) 228 lb 9 oz (103.7 kg)      Oxygen Saturation Interpretation: Normal.    Constitutional:  Alert, development consistent with age. HEENT:  NC/NT. Airway patent. Neck:  Normal ROM. Supple. Respiratory:  Clear to auscultation and breath sounds equal.  CV:  Regular rate and rhythm, normal heart sounds, without pathological murmurs, ectopy, gallops, or rubs. GI:  Abdomen Soft, nontender, good bowel sounds. No firm or pulsatile mass. Back: middle and lower lumbar spine bilateral.             Tenderness: None. Swelling: no.              Range of Motion: full range with pain. CVA Tenderness: No.            Straight leg raising:  Bilateral negative. Skin:  no erythema, rash or swelling noted. Distal Function:              Motor deficit: none. Sensory deficit: none. Pulse deficit: none. Calf Tenderness:  No Bilateral.               Edema:  none Both lower extremity(s). Reflexes: Bilateral patellar and Achilles normal.  Gait:  normal.  Integument:  Normal turgor. Warm, dry, without visible rash. Lymphatics: No lymphangitis or adenopathy noted. Neurological:  Oriented. Motor functions intact.     Lab / Imaging Results   (All laboratory and radiology results have been personally reviewed by

## 2020-06-11 NOTE — ED NOTES
Bed: 23  Expected date:   Expected time:   Means of arrival:   Comments:  EMS     John Brock RN  63/03/61 [id number]

## 2020-10-20 RX ORDER — METOPROLOL SUCCINATE 25 MG/1
TABLET, EXTENDED RELEASE ORAL
Qty: 90 TABLET | Refills: 3 | Status: SHIPPED
Start: 2020-10-20 | End: 2021-11-16

## 2020-11-30 ENCOUNTER — APPOINTMENT (OUTPATIENT)
Dept: GENERAL RADIOLOGY | Age: 65
End: 2020-11-30
Payer: COMMERCIAL

## 2020-11-30 ENCOUNTER — HOSPITAL ENCOUNTER (EMERGENCY)
Age: 65
Discharge: HOME | End: 2020-11-30
Attending: EMERGENCY MEDICINE
Payer: COMMERCIAL

## 2020-11-30 VITALS
SYSTOLIC BLOOD PRESSURE: 138 MMHG | TEMPERATURE: 98.1 F | HEIGHT: 72 IN | BODY MASS INDEX: 31.83 KG/M2 | OXYGEN SATURATION: 100 % | DIASTOLIC BLOOD PRESSURE: 73 MMHG | HEART RATE: 77 BPM | RESPIRATION RATE: 18 BRPM | WEIGHT: 235 LBS

## 2020-11-30 PROCEDURE — 71045 X-RAY EXAM CHEST 1 VIEW: CPT

## 2020-11-30 PROCEDURE — U0003 INFECTIOUS AGENT DETECTION BY NUCLEIC ACID (DNA OR RNA); SEVERE ACUTE RESPIRATORY SYNDROME CORONAVIRUS 2 (SARS-COV-2) (CORONAVIRUS DISEASE [COVID-19]), AMPLIFIED PROBE TECHNIQUE, MAKING USE OF HIGH THROUGHPUT TECHNOLOGIES AS DESCRIBED BY CMS-2020-01-R: HCPCS

## 2020-11-30 PROCEDURE — 99283 EMERGENCY DEPT VISIT LOW MDM: CPT

## 2020-11-30 ASSESSMENT — PAIN SCALES - GENERAL: PAINLEVEL_OUTOF10: 4

## 2020-11-30 NOTE — ED PROVIDER NOTES
Patient is a 70-year-old male presents to the emergency department with a complaint of a \"tickle in his throat and mild cough\" since Saturday. Patient otherwise has been feeling well. Patient stating that his wife is feeling quite ill and she also presented to the emergency department for evaluation today, he wants checked out just because she is very sick as well. Patient denies chest pain, shortness of breath, headache, nausea, vomiting, fever, chills, syncope. Patient denies any GI or urinary symptoms. Patient denies any trauma. Patient denies decreased appetite, decreased energy, abdominal pain. Patient has not had any recent contacts with COVID-19. Patient denies any other URI symptoms, ear pain, nasal congestion, postnasal drip, rhinorrhea. The history is provided by the patient. No  was used. Review of Systems   Constitutional: Negative for appetite change, chills, fatigue and fever. HENT: Positive for sore throat. Negative for congestion, ear pain, facial swelling, rhinorrhea, sneezing, trouble swallowing and voice change. Eyes: Negative for visual disturbance. Respiratory: Positive for cough. Negative for shortness of breath. Cardiovascular: Negative for chest pain, palpitations and leg swelling. Gastrointestinal: Negative for abdominal pain, diarrhea, nausea and vomiting. Endocrine: Negative for polyuria. Genitourinary: Negative for dysuria, frequency, hematuria and urgency. Musculoskeletal: Negative for arthralgias, back pain, myalgias and neck pain. Skin: Negative for rash. Allergic/Immunologic: Negative for immunocompromised state. Neurological: Negative for dizziness, syncope, weakness, numbness and headaches. Psychiatric/Behavioral: Negative for agitation. The patient is not nervous/anxious. Physical Exam  Vitals signs and nursing note reviewed. Constitutional:       General: He is awake. He is not in acute distress. Appearance: He is normal weight. He is not ill-appearing or toxic-appearing. HENT:      Head: Normocephalic and atraumatic. Right Ear: Tympanic membrane normal.      Nose: Nose normal.      Mouth/Throat:      Mouth: Mucous membranes are moist.      Pharynx: Oropharynx is clear. Eyes:      Extraocular Movements: Extraocular movements intact. Pupils: Pupils are equal, round, and reactive to light. Neck:      Musculoskeletal: Normal range of motion and neck supple. Cardiovascular:      Rate and Rhythm: Normal rate and regular rhythm. Pulses: Normal pulses. Heart sounds: Normal heart sounds. Pulmonary:      Effort: Pulmonary effort is normal.      Breath sounds: Normal breath sounds. Abdominal:      General: Bowel sounds are normal. There is no distension. Palpations: Abdomen is soft. Tenderness: There is no abdominal tenderness. There is no guarding or rebound. Musculoskeletal: Normal range of motion. Skin:     General: Skin is warm and dry. Capillary Refill: Capillary refill takes less than 2 seconds. Neurological:      Mental Status: He is alert and oriented to person, place, and time. Psychiatric:         Behavior: Behavior is cooperative. MDM  Number of Diagnoses or Management Options  Cough:   Viral illness:   Diagnosis management comments: Patient is a 70-year-old male who presents to the emergency department with a complaint of a \"tickle in his throat \"and a cough. This is been ongoing for a few days. Patient is concerned as his wife is more ill and presents to the emergency department with him today. Patient without any other symptoms. Patient presents awake, alert, following all commands, speaking in full sentences, no apparent distress. Vital signs reviewed showing no hypoxia, hypotension, tachycardia, and patient is afebrile. Patient physical exam is unremarkable. HEENT is unremarkable.   Patient work-up including chest x-ray shows bilateral ---------------------------  Date / Time Roomed:  11/30/2020  8:09 AM  ED Bed Assignment:  28/28    The nursing notes within the ED encounter and vital signs as below have been reviewed. /73   Pulse 77   Temp 98.1 °F (36.7 °C) (Oral)   Resp 18   Ht 6' (1.829 m)   Wt 235 lb (106.6 kg)   SpO2 100%   BMI 31.87 kg/m²   Oxygen Saturation Interpretation: Normal      ------------------------------------------ PROGRESS NOTES ------------------------------------------  1:58 AM EST  I have spoken with the patient and discussed todays results, in addition to providing specific details for the plan of care and counseling regarding the diagnosis and prognosis. Their questions are answered at this time and they are agreeable with the plan. I discussed at length with them reasons for immediate return here for re evaluation. They will followup with their primary care physician by calling their office as needed. --------------------------------- ADDITIONAL PROVIDER NOTES ---------------------------------  At this time the patient is without objective evidence of an acute process requiring hospitalization or inpatient management. They have remained hemodynamically stable throughout their entire ED visit and are stable for discharge with outpatient follow-up. The plan has been discussed in detail and they are aware of the specific conditions for emergent return, as well as the importance of follow-up. Discharge Medication List as of 11/30/2020  9:44 AM        Diagnosis:  1. Cough    2. Viral illness      Disposition:  Patient's disposition: Discharge to home  Patient's condition is stable. 12/1/20, 1:58 AM EST.     This note is prepared by Mell Vicente MD -PGY-1           Mell Vicente MD  Resident  12/01/20 0386

## 2020-12-01 ENCOUNTER — CARE COORDINATION (OUTPATIENT)
Dept: CASE MANAGEMENT | Age: 65
End: 2020-12-01

## 2020-12-01 LAB
SARS-COV-2: DETECTED
SOURCE: ABNORMAL

## 2020-12-01 ASSESSMENT — ENCOUNTER SYMPTOMS
RHINORRHEA: 0
VOMITING: 0
BACK PAIN: 0
DIARRHEA: 0
TROUBLE SWALLOWING: 0
COUGH: 1
SHORTNESS OF BREATH: 0
VOICE CHANGE: 0
FACIAL SWELLING: 0
NAUSEA: 0
SORE THROAT: 1
ABDOMINAL PAIN: 0

## 2020-12-01 NOTE — CARE COORDINATION
Spoke with patient who states he has had a cough but no other symptoms, states his wife has been very ill. Signed patient and wife up for loop as they await results    Patient contacted regarding recent visit for viral symptoms. This Bridget Lima contacted the patient by telephone to perform post discharge call. Verified name and  with patient as identifiers. Provided introduction to self, and reason for call due to viral symptoms of infection and/or exposure to COVID-19. Call within 2 business days of discharge: Yes       Patient presented to emergency department/flu clinic with complaints of viral symptoms/exposure to COVID. Patient reports symptoms are the same. Due to no new or worsening symptoms the RN CTN/ACM was not notified for escalation. Discussed exposure protocols and quarantine with CDC Guidelines What To Do If You Are Sick    Patient was given an opportunity for questions and concerns. Stay home except to get medical care    Separate yourself from other people and animals in your home    Call ahead before visiting your doctor    Wear a facemask    Cover your coughs and sneezes    Clean your hands often    Avoid sharing personal household items    Clean all high-touch surfaces everyday    Monitor your symptoms  Seek prompt medical attention if your illness is worsening (e.g., difficulty breathing). Before seeking care, call your healthcare provider and tell them that you have, or are being evaluated for, COVID-19. Put on a facemask before you enter the facility. These steps will help the healthcare provider's office to keep other people in the office or waiting room from getting infected or exposed. Ask your healthcare provider to call the local or state health department. Persons who are placed under If you have a medical emergency and need to call 911, notify the dispatch personnel that you have, or are being evaluated for COVID-19.  If possible, put on a facemask before emergency medical

## 2020-12-12 ENCOUNTER — HOSPITAL ENCOUNTER (INPATIENT)
Age: 65
LOS: 5 days | Discharge: HOME | DRG: 177 | End: 2020-12-17
Attending: EMERGENCY MEDICINE | Admitting: INTERNAL MEDICINE
Payer: COMMERCIAL

## 2020-12-12 ENCOUNTER — APPOINTMENT (OUTPATIENT)
Dept: CT IMAGING | Age: 65
DRG: 177 | End: 2020-12-12
Payer: COMMERCIAL

## 2020-12-12 PROBLEM — U07.1 ACUTE RESPIRATORY FAILURE DUE TO COVID-19 (HCC): Status: ACTIVE | Noted: 2020-12-12

## 2020-12-12 PROBLEM — J96.00 ACUTE RESPIRATORY FAILURE DUE TO COVID-19 (HCC): Status: ACTIVE | Noted: 2020-12-12

## 2020-12-12 LAB
ALBUMIN SERPL-MCNC: 3.9 G/DL (ref 3.5–5.2)
ALP BLD-CCNC: 98 U/L (ref 40–129)
ALT SERPL-CCNC: 46 U/L (ref 0–40)
ANION GAP SERPL CALCULATED.3IONS-SCNC: 14 MMOL/L (ref 7–16)
AST SERPL-CCNC: 44 U/L (ref 0–39)
BASOPHILS ABSOLUTE: 0.01 E9/L (ref 0–0.2)
BASOPHILS RELATIVE PERCENT: 0.1 % (ref 0–2)
BILIRUB SERPL-MCNC: 0.7 MG/DL (ref 0–1.2)
BUN BLDV-MCNC: 19 MG/DL (ref 8–23)
CALCIUM SERPL-MCNC: 9.6 MG/DL (ref 8.6–10.2)
CHLORIDE BLD-SCNC: 92 MMOL/L (ref 98–107)
CO2: 28 MMOL/L (ref 22–29)
CREAT SERPL-MCNC: 1 MG/DL (ref 0.7–1.2)
EOSINOPHILS ABSOLUTE: 0 E9/L (ref 0.05–0.5)
EOSINOPHILS RELATIVE PERCENT: 0 % (ref 0–6)
GFR AFRICAN AMERICAN: >60
GFR NON-AFRICAN AMERICAN: >60 ML/MIN/1.73
GLUCOSE BLD-MCNC: 139 MG/DL (ref 74–99)
HCT VFR BLD CALC: 51.9 % (ref 37–54)
HEMOGLOBIN: 17.4 G/DL (ref 12.5–16.5)
IMMATURE GRANULOCYTES #: 0.13 E9/L
IMMATURE GRANULOCYTES %: 1.3 % (ref 0–5)
LACTIC ACID, SEPSIS: 1.7 MMOL/L (ref 0.5–1.9)
LACTIC ACID, SEPSIS: 3.8 MMOL/L (ref 0.5–1.9)
LYMPHOCYTES ABSOLUTE: 0.84 E9/L (ref 1.5–4)
LYMPHOCYTES RELATIVE PERCENT: 8.3 % (ref 20–42)
MCH RBC QN AUTO: 28.9 PG (ref 26–35)
MCHC RBC AUTO-ENTMCNC: 33.5 % (ref 32–34.5)
MCV RBC AUTO: 86.1 FL (ref 80–99.9)
MONOCYTES ABSOLUTE: 0.55 E9/L (ref 0.1–0.95)
MONOCYTES RELATIVE PERCENT: 5.4 % (ref 2–12)
NEUTROPHILS ABSOLUTE: 8.58 E9/L (ref 1.8–7.3)
NEUTROPHILS RELATIVE PERCENT: 84.9 % (ref 43–80)
PDW BLD-RTO: 13.3 FL (ref 11.5–15)
PLATELET # BLD: 400 E9/L (ref 130–450)
PMV BLD AUTO: 9.7 FL (ref 7–12)
POTASSIUM SERPL-SCNC: 3.9 MMOL/L (ref 3.5–5)
PRO-BNP: 67 PG/ML (ref 0–125)
RBC # BLD: 6.03 E12/L (ref 3.8–5.8)
SODIUM BLD-SCNC: 134 MMOL/L (ref 132–146)
TOTAL PROTEIN: 8.4 G/DL (ref 6.4–8.3)
TROPONIN: <0.01 NG/ML (ref 0–0.03)
WBC # BLD: 10.1 E9/L (ref 4.5–11.5)

## 2020-12-12 PROCEDURE — 83880 ASSAY OF NATRIURETIC PEPTIDE: CPT

## 2020-12-12 PROCEDURE — 6360000004 HC RX CONTRAST MEDICATION: Performed by: RADIOLOGY

## 2020-12-12 PROCEDURE — 85025 COMPLETE CBC W/AUTO DIFF WBC: CPT

## 2020-12-12 PROCEDURE — 71275 CT ANGIOGRAPHY CHEST: CPT

## 2020-12-12 PROCEDURE — 2580000003 HC RX 258: Performed by: INTERNAL MEDICINE

## 2020-12-12 PROCEDURE — 6370000000 HC RX 637 (ALT 250 FOR IP): Performed by: INTERNAL MEDICINE

## 2020-12-12 PROCEDURE — 83605 ASSAY OF LACTIC ACID: CPT

## 2020-12-12 PROCEDURE — 6360000002 HC RX W HCPCS: Performed by: INTERNAL MEDICINE

## 2020-12-12 PROCEDURE — 80053 COMPREHEN METABOLIC PANEL: CPT

## 2020-12-12 PROCEDURE — 2580000003 HC RX 258: Performed by: STUDENT IN AN ORGANIZED HEALTH CARE EDUCATION/TRAINING PROGRAM

## 2020-12-12 PROCEDURE — 93005 ELECTROCARDIOGRAM TRACING: CPT | Performed by: STUDENT IN AN ORGANIZED HEALTH CARE EDUCATION/TRAINING PROGRAM

## 2020-12-12 PROCEDURE — 96360 HYDRATION IV INFUSION INIT: CPT

## 2020-12-12 PROCEDURE — 87040 BLOOD CULTURE FOR BACTERIA: CPT

## 2020-12-12 PROCEDURE — 84484 ASSAY OF TROPONIN QUANT: CPT

## 2020-12-12 PROCEDURE — 99284 EMERGENCY DEPT VISIT MOD MDM: CPT

## 2020-12-12 PROCEDURE — 2060000000 HC ICU INTERMEDIATE R&B

## 2020-12-12 PROCEDURE — 2500000003 HC RX 250 WO HCPCS: Performed by: INTERNAL MEDICINE

## 2020-12-12 RX ORDER — 0.9 % SODIUM CHLORIDE 0.9 %
30 INTRAVENOUS SOLUTION INTRAVENOUS PRN
Status: DISCONTINUED | OUTPATIENT
Start: 2020-12-12 | End: 2020-12-17 | Stop reason: HOSPADM

## 2020-12-12 RX ORDER — SODIUM CHLORIDE 9 MG/ML
INJECTION, SOLUTION INTRAVENOUS CONTINUOUS
Status: DISCONTINUED | OUTPATIENT
Start: 2020-12-12 | End: 2020-12-17 | Stop reason: HOSPADM

## 2020-12-12 RX ORDER — SODIUM CHLORIDE 0.9 % (FLUSH) 0.9 %
10 SYRINGE (ML) INJECTION EVERY 12 HOURS SCHEDULED
Status: DISCONTINUED | OUTPATIENT
Start: 2020-12-12 | End: 2020-12-17 | Stop reason: HOSPADM

## 2020-12-12 RX ORDER — ACETAMINOPHEN 650 MG/1
650 SUPPOSITORY RECTAL EVERY 6 HOURS PRN
Status: DISCONTINUED | OUTPATIENT
Start: 2020-12-12 | End: 2020-12-17 | Stop reason: HOSPADM

## 2020-12-12 RX ORDER — GUAIFENESIN/DEXTROMETHORPHAN 100-10MG/5
10 SYRUP ORAL EVERY 6 HOURS PRN
Status: DISCONTINUED | OUTPATIENT
Start: 2020-12-12 | End: 2020-12-17 | Stop reason: HOSPADM

## 2020-12-12 RX ORDER — SODIUM CHLORIDE 0.9 % (FLUSH) 0.9 %
10 SYRINGE (ML) INJECTION PRN
Status: DISCONTINUED | OUTPATIENT
Start: 2020-12-12 | End: 2020-12-17 | Stop reason: HOSPADM

## 2020-12-12 RX ORDER — DEXAMETHASONE 4 MG/1
6 TABLET ORAL DAILY
Status: DISCONTINUED | OUTPATIENT
Start: 2020-12-12 | End: 2020-12-13

## 2020-12-12 RX ORDER — ONDANSETRON 2 MG/ML
4 INJECTION INTRAMUSCULAR; INTRAVENOUS EVERY 6 HOURS PRN
Status: DISCONTINUED | OUTPATIENT
Start: 2020-12-12 | End: 2020-12-17 | Stop reason: HOSPADM

## 2020-12-12 RX ORDER — POLYETHYLENE GLYCOL 3350 17 G/17G
17 POWDER, FOR SOLUTION ORAL DAILY PRN
Status: DISCONTINUED | OUTPATIENT
Start: 2020-12-12 | End: 2020-12-17 | Stop reason: HOSPADM

## 2020-12-12 RX ORDER — ASPIRIN 81 MG/1
81 TABLET ORAL DAILY
Status: DISCONTINUED | OUTPATIENT
Start: 2020-12-13 | End: 2020-12-17 | Stop reason: HOSPADM

## 2020-12-12 RX ORDER — 0.9 % SODIUM CHLORIDE 0.9 %
1000 INTRAVENOUS SOLUTION INTRAVENOUS ONCE
Status: COMPLETED | OUTPATIENT
Start: 2020-12-12 | End: 2020-12-12

## 2020-12-12 RX ORDER — METOPROLOL SUCCINATE 25 MG/1
25 TABLET, EXTENDED RELEASE ORAL DAILY
Status: DISCONTINUED | OUTPATIENT
Start: 2020-12-13 | End: 2020-12-17 | Stop reason: HOSPADM

## 2020-12-12 RX ORDER — PROMETHAZINE HYDROCHLORIDE 25 MG/1
12.5 TABLET ORAL EVERY 6 HOURS PRN
Status: DISCONTINUED | OUTPATIENT
Start: 2020-12-12 | End: 2020-12-17 | Stop reason: HOSPADM

## 2020-12-12 RX ORDER — OXYCODONE AND ACETAMINOPHEN 10; 325 MG/1; MG/1
1 TABLET ORAL EVERY 6 HOURS PRN
Status: DISCONTINUED | OUTPATIENT
Start: 2020-12-12 | End: 2020-12-17 | Stop reason: HOSPADM

## 2020-12-12 RX ORDER — ACETAMINOPHEN 325 MG/1
650 TABLET ORAL EVERY 6 HOURS PRN
Status: DISCONTINUED | OUTPATIENT
Start: 2020-12-12 | End: 2020-12-17 | Stop reason: HOSPADM

## 2020-12-12 RX ADMIN — GUAIFENESIN AND DEXTROMETHORPHAN 10 ML: 100; 10 SYRUP ORAL at 23:52

## 2020-12-12 RX ADMIN — SODIUM CHLORIDE, PRESERVATIVE FREE 10 ML: 5 INJECTION INTRAVENOUS at 23:53

## 2020-12-12 RX ADMIN — REMDESIVIR 200 MG: 100 INJECTION, POWDER, LYOPHILIZED, FOR SOLUTION INTRAVENOUS at 17:31

## 2020-12-12 RX ADMIN — SODIUM CHLORIDE, PRESERVATIVE FREE 10 ML: 5 INJECTION INTRAVENOUS at 17:33

## 2020-12-12 RX ADMIN — SODIUM CHLORIDE: 9 INJECTION, SOLUTION INTRAVENOUS at 23:52

## 2020-12-12 RX ADMIN — SODIUM CHLORIDE 1000 ML: 9 INJECTION, SOLUTION INTRAVENOUS at 11:29

## 2020-12-12 RX ADMIN — DEXAMETHASONE 6 MG: 4 TABLET ORAL at 17:31

## 2020-12-12 RX ADMIN — IOPAMIDOL 110 ML: 755 INJECTION, SOLUTION INTRAVENOUS at 10:39

## 2020-12-12 RX ADMIN — SODIUM CHLORIDE 30 ML: 9 INJECTION, SOLUTION INTRAVENOUS at 18:26

## 2020-12-12 ASSESSMENT — ENCOUNTER SYMPTOMS
RHINORRHEA: 0
CHEST TIGHTNESS: 1
ANAL BLEEDING: 0
ABDOMINAL PAIN: 0
CONSTIPATION: 0
VOMITING: 0
EYE ITCHING: 0
BLOOD IN STOOL: 0
SHORTNESS OF BREATH: 1
SORE THROAT: 0
EYE REDNESS: 0
BACK PAIN: 0
DIARRHEA: 1
COUGH: 1
NAUSEA: 0

## 2020-12-12 ASSESSMENT — PAIN SCALES - GENERAL: PAINLEVEL_OUTOF10: 0

## 2020-12-12 NOTE — ED PROVIDER NOTES
Patient is a 57-year-old male with a history of CAD, hyperlipidemia, recent COVID-19 infection that presents emergency department for evaluation of shortness of breath. Patient started having symptoms of COVID-19 just over 2 weeks ago. He was tested as an outpatient at that time and came back positive. He states that chills, body aches have improved however he has had worsening fatigue, diarrhea and shortness of breath. Shortness of breath significantly worse over the last 2 days. Seems to be worsened with ambulation. Rest slightly improve visit. It has been constant and severe. Admits to intermittent lightheadedness. Tried taking a Medrol Dosepak with no improvement of symptoms. Denies fever, abdominal pain, nausea, vomiting, dysuria, hematuria. The history is provided by the patient. Review of Systems   Constitutional: Positive for activity change, appetite change, chills and fatigue. Negative for fever. HENT: Negative for congestion, rhinorrhea and sore throat. Eyes: Negative for redness and itching. Respiratory: Positive for cough, chest tightness and shortness of breath. Cardiovascular: Negative for chest pain. Gastrointestinal: Positive for diarrhea. Negative for abdominal pain, anal bleeding, blood in stool, constipation, nausea and vomiting. Genitourinary: Negative for decreased urine volume, difficulty urinating, dysuria and urgency. Musculoskeletal: Positive for myalgias. Negative for back pain. Skin: Negative for pallor and rash. Neurological: Positive for light-headedness. Negative for dizziness, syncope, weakness and headaches. Physical Exam  Vitals signs and nursing note reviewed. Constitutional:       General: He is not in acute distress. Appearance: Normal appearance. He is well-developed. He is obese. He is ill-appearing. He is not diaphoretic. HENT:      Head: Normocephalic and atraumatic.    Eyes: Extraocular Movements: Extraocular movements intact. Pupils: Pupils are equal, round, and reactive to light. Cardiovascular:      Rate and Rhythm: Regular rhythm. Tachycardia present. Pulses: Normal pulses. Pulmonary:      Effort: Pulmonary effort is normal. Tachypnea present. No respiratory distress. Breath sounds: Rhonchi (Mild diffuse scattered rhonchi.) present. No wheezing or rales. Chest:      Chest wall: No tenderness. Abdominal:      Palpations: Abdomen is soft. Tenderness: There is no abdominal tenderness. There is no guarding or rebound. Musculoskeletal:      Right lower leg: No edema. Left lower leg: No edema. Lymphadenopathy:      Cervical: No cervical adenopathy. Skin:     General: Skin is warm and dry. Neurological:      Mental Status: He is alert and oriented to person, place, and time. Procedures     MDM  Number of Diagnoses or Management Options  Acute respiratory failure due to COVID-19 [facility])  Diagnosis management comments: Patient is a 70-year-old male that presents emergency department for evaluation of shortness of breath, fatigue. Patient ill-appearing but in no obvious distress on exam.  He is mildly tachypneic on presentation and oxygen saturation is noted to be 82% on room air. Placed on 4 L nasal cannula with improvement of oxygenation. Blood work was unremarkable. EKG showed normal sinus rhythm with left axis deviation but was otherwise unchanged from previous. CT of the chest showed no evidence of pulmonary embolism but did show findings consistent with patient's known diagnosis of COVID-19. Plan to admit patient for further treatment and evaluation of his hypoxia secondary to Covid. He remained hemodynamically stable throughout stay in the emergency department.        ED Course as of Dec 12 1740   Sat Dec 12, 2020 12 CTA showed bilateral infiltrates consistent with his COVID-19 pneumonia diagnosis. No pulmonary embolism noted on the CTA. As patient is hypoxic requiring supplemental oxygen will admit for further evaluation and treatment. [JL]      ED Course User Index  [JL] Avril Alvarado, DO       --------------------------------------------- PAST HISTORY ---------------------------------------------  Past Medical History:  has a past medical history of CAD (coronary artery disease), Coronary artery disease, Hyperlipidemia LDL goal < 100, and Pulmonary asbestosis ([address] Past Surgical History:  has a past surgical history that includes knee surgery (years ago); shoulder surgery (years ago); back surgery; and Cardiac catheterization (8/6/2013). Social History:  reports that he has never smoked. His smokeless tobacco use includes chew. He reports current alcohol use of about 4.0 standard drinks of alcohol per week. He reports that he does not use drugs. Family History: family history includes Heart Disease (age of onset: 54) in his father. The patients home medications have been reviewed. Allergies: Patient has no known allergies.     -------------------------------------------------- RESULTS -------------------------------------------------    LABS:  Results for orders placed or performed during the hospital encounter of 12/12/20   CBC Auto Differential   Result Value Ref Range    WBC 10.1 4.5 - 11.5 E9/L    RBC 6.03 (H) 3.80 - 5.80 E12/L    Hemoglobin 17.4 (H) 12.5 - 16.5 g/dL    Hematocrit 51.9 37.0 - 54.0 %    MCV 86.1 80.0 - 99.9 fL    MCH 28.9 26.0 - 35.0 pg    MCHC 33.5 32.0 - 34.5 %    RDW 13.3 11.5 - 15.0 fL    Platelets 286 896 - 474 E9/L    MPV 9.7 7.0 - 12.0 fL    Neutrophils % 84.9 (H) 43.0 - 80.0 %    Immature Granulocytes % 1.3 0.0 - 5.0 %    Lymphocytes % 8.3 (L) 20.0 - 42.0 %    Monocytes % 5.4 2.0 - 12.0 %    Eosinophils % 0.0 0.0 - 6.0 %    Basophils % 0.1 0.0 - 2.0 % ------------------------- NURSING NOTES AND VITALS REVIEWED ---------------------------  Date / Time Roomed:  12/12/2020  8:39 AM  ED Bed Assignment:  03/03    The nursing notes within the ED encounter and vital signs as below have been reviewed. Patient Vitals for the past 24 hrs:   BP Temp Temp src Pulse Resp SpO2 Height Weight   12/12/20 1725 125/68   77 20 93 %     12/12/20 1636 125/73   75       12/12/20 1600 125/73   74       12/12/20 1503 125/73   75 16 95 %     12/12/20 1415 137/81   76  97 %     12/12/20 1344 137/81   72 20 99 %     12/12/20 1315 136/83   75       12/12/20 1125 136/83   88 18 97 %     12/12/20 0849      93 %     12/12/20 0844 (!) 141/75 97.2 °F (36.2 °C) Temporal 105 18 (!) 82 % 6' (1.829 m) 230 lb (104.3 kg)       Oxygen Saturation Interpretation: Abnormal    ------------------------------------------ PROGRESS NOTES ------------------------------------------  Re-evaluation(s):  Time: 1729  Patients symptoms show no change  Repeat physical examination is not changed    Counseling:  I have spoken with the patient and discussed todays results, in addition to providing specific details for the plan of care and counseling regarding the diagnosis and prognosis. Their questions are answered at this time and they are agreeable with the plan of admission.    --------------------------------- ADDITIONAL PROVIDER NOTES ---------------------------------  Consultations:  Time: 1300. Spoke with Dr. Jannet Martinez. Discussed case. They will admit the patient. This patient's ED course included: a personal history and physicial examination, re-evaluation prior to disposition, multiple bedside re-evaluations, IV medications, cardiac monitoring and continuous pulse oximetry    This patient has remained hemodynamically stable during their ED course. Diagnosis:  1.  Acute respiratory failure due to COVID-19 [facility])        Disposition: Patient's disposition: Admit to telemetry  Patient's condition is stable.          Mohinder Christiansen., DO  Resident  12/12/20 5251

## 2020-12-13 LAB
AMMONIA: 18.7 UMOL/L (ref 16–60)
BILIRUBIN URINE: NEGATIVE
BLOOD, URINE: NEGATIVE
CLARITY: CLEAR
COLOR: YELLOW
EKG ATRIAL RATE: 89 BPM
EKG P AXIS: 46 DEGREES
EKG P-R INTERVAL: 160 MS
EKG Q-T INTERVAL: 356 MS
EKG QRS DURATION: 110 MS
EKG QTC CALCULATION (BAZETT): 433 MS
EKG R AXIS: -34 DEGREES
EKG T AXIS: 48 DEGREES
EKG VENTRICULAR RATE: 89 BPM
GLUCOSE URINE: 100 MG/DL
KETONES, URINE: NEGATIVE MG/DL
LEUKOCYTE ESTERASE, URINE: NEGATIVE
NITRITE, URINE: NEGATIVE
PH UA: 5.5 (ref 5–9)
PROCALCITONIN: 0.1 NG/ML (ref 0–0.08)
PROTEIN UA: NEGATIVE MG/DL
SPECIFIC GRAVITY UA: 1.02 (ref 1–1.03)
TSH SERPL DL<=0.05 MIU/L-ACNC: 0.4 UIU/ML (ref 0.27–4.2)
UROBILINOGEN, URINE: 0.2 E.U./DL

## 2020-12-13 PROCEDURE — 2060000000 HC ICU INTERMEDIATE R&B

## 2020-12-13 PROCEDURE — 2580000003 HC RX 258: Performed by: STUDENT IN AN ORGANIZED HEALTH CARE EDUCATION/TRAINING PROGRAM

## 2020-12-13 PROCEDURE — 2700000000 HC OXYGEN THERAPY PER DAY

## 2020-12-13 PROCEDURE — 84145 PROCALCITONIN (PCT): CPT

## 2020-12-13 PROCEDURE — 81003 URINALYSIS AUTO W/O SCOPE: CPT

## 2020-12-13 PROCEDURE — 36415 COLL VENOUS BLD VENIPUNCTURE: CPT

## 2020-12-13 PROCEDURE — 6370000000 HC RX 637 (ALT 250 FOR IP): Performed by: INTERNAL MEDICINE

## 2020-12-13 PROCEDURE — 84443 ASSAY THYROID STIM HORMONE: CPT

## 2020-12-13 PROCEDURE — 2580000003 HC RX 258: Performed by: INTERNAL MEDICINE

## 2020-12-13 PROCEDURE — XW033E5 INTRODUCTION OF REMDESIVIR ANTI-INFECTIVE INTO PERIPHERAL VEIN, PERCUTANEOUS APPROACH, NEW TECHNOLOGY GROUP 5: ICD-10-PCS | Performed by: INTERNAL MEDICINE

## 2020-12-13 PROCEDURE — 82140 ASSAY OF AMMONIA: CPT

## 2020-12-13 PROCEDURE — 6360000002 HC RX W HCPCS: Performed by: INTERNAL MEDICINE

## 2020-12-13 PROCEDURE — 2500000003 HC RX 250 WO HCPCS: Performed by: INTERNAL MEDICINE

## 2020-12-13 RX ORDER — CHOLECALCIFEROL (VITAMIN D3) 50 MCG
2000 TABLET ORAL DAILY
Status: DISCONTINUED | OUTPATIENT
Start: 2020-12-13 | End: 2020-12-17 | Stop reason: HOSPADM

## 2020-12-13 RX ADMIN — GUAIFENESIN AND DEXTROMETHORPHAN 10 ML: 100; 10 SYRUP ORAL at 20:05

## 2020-12-13 RX ADMIN — Medication 2000 UNITS: at 15:39

## 2020-12-13 RX ADMIN — REMDESIVIR 100 MG: 100 INJECTION, POWDER, LYOPHILIZED, FOR SOLUTION INTRAVENOUS at 15:39

## 2020-12-13 RX ADMIN — SODIUM CHLORIDE, PRESERVATIVE FREE 10 ML: 5 INJECTION INTRAVENOUS at 09:54

## 2020-12-13 RX ADMIN — DEXAMETHASONE 6 MG: 4 TABLET ORAL at 20:05

## 2020-12-13 RX ADMIN — SODIUM CHLORIDE, PRESERVATIVE FREE 10 ML: 5 INJECTION INTRAVENOUS at 20:05

## 2020-12-13 RX ADMIN — ENOXAPARIN SODIUM 40 MG: 40 INJECTION SUBCUTANEOUS at 20:05

## 2020-12-13 RX ADMIN — METOPROLOL SUCCINATE 25 MG: 25 TABLET, EXTENDED RELEASE ORAL at 09:52

## 2020-12-13 RX ADMIN — SODIUM CHLORIDE, PRESERVATIVE FREE 10 ML: 5 INJECTION INTRAVENOUS at 09:55

## 2020-12-13 RX ADMIN — SODIUM CHLORIDE: 9 INJECTION, SOLUTION INTRAVENOUS at 18:48

## 2020-12-13 RX ADMIN — ASPIRIN 81 MG: 81 TABLET, COATED ORAL at 09:53

## 2020-12-13 RX ADMIN — DEXAMETHASONE 6 MG: 4 TABLET ORAL at 09:52

## 2020-12-13 ASSESSMENT — PAIN SCALES - GENERAL
PAINLEVEL_OUTOF10: 0

## 2020-12-13 NOTE — H&P
[address] [facility]  Attending History and Physical      CHIEF COMPLAINT:  SOB, weakness      HISTORY OF PRESENT ILLNESS:      The patient is a 72 y.o. male patient of Crista Key MD who presents with complaints of shortness of breath weakness and fatigue. Diagnosed with Covid 2 weeks ago. Since then had fever chills and worsening shortness of breath. Complaining of severe dyspnea on exertion. Is healthy at baseline, no history of COPD, no home oxygen. Recently on Medrol Dosepak. In ED found to have bilateral groundglass opacities, is currently on 4 L via nasal cannula. Patient is also slightly confused, having difficulty recalling information. Past Medical History:    Past Medical History:   Diagnosis Date    CAD (coronary artery disease)     Coronary artery disease 8/28/2013    70% LAD on cath 8/6/13 s/p 3.5 X 18 mm Resolute drug-eluting stent to proximal-mid LAD. Mild luminal irreg in RCA and CX    Hyperlipidemia LDL goal < 100 6/5/2014    Pulmonary asbestosis ([address]        Past Surgical History:    Past Surgical History:   Procedure Laterality Date    BACK SURGERY      lumbar surgery approx 2003    CARDIAC CATHETERIZATION  8/6/2013    by Dr. Saeid Bo, drug eluding stent to prox lad    KNEE SURGERY  years ago    right    SHOULDER SURGERY  years ago    left       Medications Prior to Admission:    Medications Prior to Admission: metoprolol succinate (TOPROL XL) 25 MG extended release tablet, TAKE ONE TABLET BY MOUTH EVERY DAY  nitroGLYCERIN (NITROSTAT) 0.4 MG SL tablet, Place 1 tablet under the tongue every 5 minutes as needed for Chest pain (Patient not taking: Reported on 6/10/2020)  aspirin 81 MG tablet, Take 81 mg by mouth daily. oxyCODONE-acetaminophen (PERCOCET)  MG per tablet, Take 1 tablet by mouth every 6 hours as needed. Allergies:    Patient has no known allergies.     Social History: reports that he has never smoked. His smokeless tobacco use includes chew. He reports current alcohol use of about 4.0 standard drinks of alcohol per week. He reports that he does not use drugs. Family History:   family history includes Heart Disease (age of onset: 54) in his father. REVIEW OF SYSTEMS:  As above in the HPI, otherwise negative    PHYSICAL EXAM:    Vitals:  BP (!) 144/79   Pulse 83   Temp 97.6 °F (36.4 °C) (Oral)   Resp 18   Ht 6' (1.829 m)   Wt 230 lb (104.3 kg)   SpO2 93%   BMI 31.19 kg/m²     General:  Awake, alert, oriented X 3. Slightly lethargic  HEENT:  Normocephalic, atraumatic. Pupils equal, round, reactive to light. No scleral icterus. No conjunctival injection. Normal lips, teeth, and gums. No nasal discharge. Neck:  Supple  Heart:  RRR, no murmurs, gallops, rubs  Lungs:  CTA bilaterally, bilat symmetrical expansion, no wheeze, rales, or rhonchi  Abdomen:   Bowel sounds present, soft, nontender, no masses, no organomegaly, no peritoneal signs  Extremities:  No clubbing, cyanosis, or edema  Skin:  Warm and dry, no open lesions or rash  Neuro:  Cranial nerves 2-12 intact, no focal deficits  Breast: deferred  Rectal: deferred  Genitalia:  deferred    LABS:    CBC:   Lab Results   Component Value Date    WBC 10.1 12/12/2020    RBC 6.03 12/12/2020    HGB 17.4 12/12/2020    HCT 51.9 12/12/2020    MCV 86.1 12/12/2020    MCH 28.9 12/12/2020    MCHC 33.5 12/12/2020    RDW 13.3 12/12/2020     12/12/2020    MPV 9.7 12/12/2020     BMP:    Lab Results   Component Value Date     12/12/2020    K 3.9 12/12/2020    K 4.3 07/22/2018    CL 92 12/12/2020    CO2 28 12/12/2020    BUN 19 12/12/2020    LABALBU 3.9 12/12/2020    CREATININE 1.0 12/12/2020    CALCIUM 9.6 12/12/2020    GFRAA >60 12/12/2020    LABGLOM >60 12/12/2020    GLUCOSE 139 12/12/2020       ASSESSMENT:      Patient Active Problem List   Diagnosis    Pulmonary [address]osis ([facility] [address]    Coronary artery disease  Cerebral infarction [address] [address]    Hyperlipidemia with target LDL less than 100    History of coronary artery stent placement    Syncope and collapse    Acute respiratory failure due to COVID-19 ([facility]         PLAN:    1. COVID-19 pneumonia   Continue with remdesivir and dexamethasone (twice daily due to recent outpatient steroids), vitamin D3   Wean oxygen as tolerated  2. Altered mental status-etiology unclear   Check UA TSH ammonia. No focal deficits  3. Hypertension-continue metoprolol  4.   DVT prophylaxis-Lovenox twice daily    Rebecca Vides MD  1:08 PM  12/13/2020

## 2020-12-14 LAB
ALBUMIN SERPL-MCNC: 2.8 G/DL (ref 3.5–5.2)
ALP BLD-CCNC: 63 U/L (ref 40–129)
ALT SERPL-CCNC: 36 U/L (ref 0–40)
ANION GAP SERPL CALCULATED.3IONS-SCNC: 9 MMOL/L (ref 7–16)
AST SERPL-CCNC: 20 U/L (ref 0–39)
BASOPHILS ABSOLUTE: 0.02 E9/L (ref 0–0.2)
BASOPHILS RELATIVE PERCENT: 0.2 % (ref 0–2)
BILIRUB SERPL-MCNC: 0.4 MG/DL (ref 0–1.2)
BUN BLDV-MCNC: 15 MG/DL (ref 8–23)
CALCIUM SERPL-MCNC: 8.5 MG/DL (ref 8.6–10.2)
CHLORIDE BLD-SCNC: 101 MMOL/L (ref 98–107)
CO2: 24 MMOL/L (ref 22–29)
CREAT SERPL-MCNC: 0.6 MG/DL (ref 0.7–1.2)
D DIMER: 701 NG/ML DDU
EOSINOPHILS ABSOLUTE: 0 E9/L (ref 0.05–0.5)
EOSINOPHILS RELATIVE PERCENT: 0 % (ref 0–6)
GFR AFRICAN AMERICAN: >60
GFR NON-AFRICAN AMERICAN: >60 ML/MIN/1.73
GLUCOSE BLD-MCNC: 152 MG/DL (ref 74–99)
HCT VFR BLD CALC: 44.2 % (ref 37–54)
HEMOGLOBIN: 14.7 G/DL (ref 12.5–16.5)
IMMATURE GRANULOCYTES #: 0.08 E9/L
IMMATURE GRANULOCYTES %: 0.9 % (ref 0–5)
LYMPHOCYTES ABSOLUTE: 0.75 E9/L (ref 1.5–4)
LYMPHOCYTES RELATIVE PERCENT: 8 % (ref 20–42)
MCH RBC QN AUTO: 28.7 PG (ref 26–35)
MCHC RBC AUTO-ENTMCNC: 33.3 % (ref 32–34.5)
MCV RBC AUTO: 86.2 FL (ref 80–99.9)
MONOCYTES ABSOLUTE: 0.42 E9/L (ref 0.1–0.95)
MONOCYTES RELATIVE PERCENT: 4.5 % (ref 2–12)
NEUTROPHILS ABSOLUTE: 8.06 E9/L (ref 1.8–7.3)
NEUTROPHILS RELATIVE PERCENT: 86.4 % (ref 43–80)
PDW BLD-RTO: 13.4 FL (ref 11.5–15)
PLATELET # BLD: 420 E9/L (ref 130–450)
PMV BLD AUTO: 9.7 FL (ref 7–12)
POTASSIUM SERPL-SCNC: 4.1 MMOL/L (ref 3.5–5)
RBC # BLD: 5.13 E12/L (ref 3.8–5.8)
SODIUM BLD-SCNC: 134 MMOL/L (ref 132–146)
TOTAL PROTEIN: 6.3 G/DL (ref 6.4–8.3)
WBC # BLD: 9.3 E9/L (ref 4.5–11.5)

## 2020-12-14 PROCEDURE — 80053 COMPREHEN METABOLIC PANEL: CPT

## 2020-12-14 PROCEDURE — 2500000003 HC RX 250 WO HCPCS: Performed by: INTERNAL MEDICINE

## 2020-12-14 PROCEDURE — 2060000000 HC ICU INTERMEDIATE R&B

## 2020-12-14 PROCEDURE — 6370000000 HC RX 637 (ALT 250 FOR IP): Performed by: INTERNAL MEDICINE

## 2020-12-14 PROCEDURE — 2580000003 HC RX 258: Performed by: INTERNAL MEDICINE

## 2020-12-14 PROCEDURE — 2700000000 HC OXYGEN THERAPY PER DAY

## 2020-12-14 PROCEDURE — 85378 FIBRIN DEGRADE SEMIQUANT: CPT

## 2020-12-14 PROCEDURE — 85025 COMPLETE CBC W/AUTO DIFF WBC: CPT

## 2020-12-14 PROCEDURE — 6360000002 HC RX W HCPCS: Performed by: INTERNAL MEDICINE

## 2020-12-14 PROCEDURE — 36415 COLL VENOUS BLD VENIPUNCTURE: CPT

## 2020-12-14 RX ADMIN — ENOXAPARIN SODIUM 40 MG: 40 INJECTION SUBCUTANEOUS at 08:16

## 2020-12-14 RX ADMIN — REMDESIVIR 100 MG: 100 INJECTION, POWDER, LYOPHILIZED, FOR SOLUTION INTRAVENOUS at 14:00

## 2020-12-14 RX ADMIN — Medication 2000 UNITS: at 08:16

## 2020-12-14 RX ADMIN — DEXAMETHASONE 6 MG: 4 TABLET ORAL at 20:52

## 2020-12-14 RX ADMIN — SODIUM CHLORIDE: 9 INJECTION, SOLUTION INTRAVENOUS at 20:52

## 2020-12-14 RX ADMIN — DEXAMETHASONE 6 MG: 4 TABLET ORAL at 08:16

## 2020-12-14 RX ADMIN — METOPROLOL SUCCINATE 25 MG: 25 TABLET, EXTENDED RELEASE ORAL at 08:16

## 2020-12-14 RX ADMIN — ASPIRIN 81 MG: 81 TABLET, COATED ORAL at 08:16

## 2020-12-14 RX ADMIN — SODIUM CHLORIDE, PRESERVATIVE FREE 10 ML: 5 INJECTION INTRAVENOUS at 08:17

## 2020-12-14 RX ADMIN — SODIUM CHLORIDE, PRESERVATIVE FREE 10 ML: 5 INJECTION INTRAVENOUS at 20:52

## 2020-12-14 RX ADMIN — ENOXAPARIN SODIUM 40 MG: 40 INJECTION SUBCUTANEOUS at 20:52

## 2020-12-14 ASSESSMENT — PAIN SCALES - GENERAL
PAINLEVEL_OUTOF10: 0
PAINLEVEL_OUTOF10: 0

## 2020-12-14 NOTE — PROGRESS NOTES
Subjective:    Patient seen and examined at bedside, feeling much better than day prior. Currently on 2L via NC. Mental status improved, does not remember much from the last few days    Objective:    /68   Pulse 81   Temp 97.9 °F (36.6 °C) (Oral)   Resp 18   Ht 6' (1.829 m)   Wt 230 lb (104.3 kg)   SpO2 93%   BMI 31.19 kg/m²     Current medications that patient is taking have been reviewed. Heart:  RRR, no murmurs, gallops, or rubs. Lungs:  CTA bilaterally, no wheeze, rales or rhonchi  Abd: bowel sounds present, soft, nontender, nondistended, no masses  Extrem:  No cyanosis or edema    CBC:   Lab Results   Component Value Date    WBC 9.3 12/14/2020    RBC 5.13 12/14/2020    HGB 14.7 12/14/2020    HCT 44.2 12/14/2020    MCV 86.2 12/14/2020    MCH 28.7 12/14/2020    MCHC 33.3 12/14/2020    RDW 13.4 12/14/2020     12/14/2020    MPV 9.7 12/14/2020     BMP:    Lab Results   Component Value Date     12/14/2020    K 4.1 12/14/2020    K 4.3 07/22/2018     12/14/2020    CO2 24 12/14/2020    BUN 15 12/14/2020    LABALBU 2.8 12/14/2020    CREATININE 0.6 12/14/2020    CALCIUM 8.5 12/14/2020    GFRAA >60 12/14/2020    LABGLOM >60 12/14/2020    GLUCOSE 152 12/14/2020        Assessment:    Patient Active Problem List   Diagnosis    [facility]    Coronary artery disease    Cerebral infarction ([facility]    Hyperlipidemia with target LDL less than 100    History of coronary artery stent placement    Syncope and collapse    Acute respiratory failure due to COVID-19 [facility])       Plan:    1. COVID-19 pneumonia              Continue with remdesivir and dexamethasone (twice daily due to recent outpatient steroids), vitamin D3              Wean oxygen as tolerated  2. Altered mental status-etiology unclear              UA TSH ammonia unremarkable  3. Hypertension-continue metoprolol  4.   DVT prophylaxis-Lovenox twice daily    Woodroe Glow    5:03 PM  12/14/2020

## 2020-12-14 NOTE — CARE COORDINATION
CM NOTE: Chart reviewed. Covid positive (2 weeks ago) now in droplet plus isolation. Having fever & chills with worsening of his SOB. Currently using O2 4L which he does not have at home. Slight confusion per PCP. Day #3 IV RDV, continue decadron, lovenox. CM attempted to speak with pt by phone but no answer. Recalled pt but number busy. Staff checking phone. CM was able to speak with pt by phone to discuss role, anticipated LOS & current plan of care. Pt able to speak in full sentences. Reports living with wife in 2 story home & is independent. Uses steps as needed & does not use ADs or O2 at home. Is not diabetic & has no hx EDUARDO or HHC. Discussed dx, OP [place] plan---states he was on medication & after 10 days seemed to feel worse. Currently on O2 3l. No preference for dme if needed. Plan is return home at discharge. Declines need for HHC. Will continue to follow pt.

## 2020-12-15 LAB
ALBUMIN SERPL-MCNC: 2.8 G/DL (ref 3.5–5.2)
ALP BLD-CCNC: 69 U/L (ref 40–129)
ALT SERPL-CCNC: 30 U/L (ref 0–40)
ANION GAP SERPL CALCULATED.3IONS-SCNC: 8 MMOL/L (ref 7–16)
AST SERPL-CCNC: 17 U/L (ref 0–39)
BASOPHILS ABSOLUTE: 0.03 E9/L (ref 0–0.2)
BASOPHILS RELATIVE PERCENT: 0.3 % (ref 0–2)
BILIRUB SERPL-MCNC: 0.4 MG/DL (ref 0–1.2)
BUN BLDV-MCNC: 14 MG/DL (ref 8–23)
CALCIUM SERPL-MCNC: 8.3 MG/DL (ref 8.6–10.2)
CHLORIDE BLD-SCNC: 101 MMOL/L (ref 98–107)
CO2: 29 MMOL/L (ref 22–29)
CREAT SERPL-MCNC: 0.7 MG/DL (ref 0.7–1.2)
D DIMER: 556 NG/ML DDU
EOSINOPHILS ABSOLUTE: 0 E9/L (ref 0.05–0.5)
EOSINOPHILS RELATIVE PERCENT: 0 % (ref 0–6)
GFR AFRICAN AMERICAN: >60
GFR NON-AFRICAN AMERICAN: >60 ML/MIN/1.73
GLUCOSE BLD-MCNC: 132 MG/DL (ref 74–99)
HCT VFR BLD CALC: 43.8 % (ref 37–54)
HEMOGLOBIN: 14.2 G/DL (ref 12.5–16.5)
IMMATURE GRANULOCYTES #: 0.12 E9/L
IMMATURE GRANULOCYTES %: 1.1 % (ref 0–5)
LYMPHOCYTES ABSOLUTE: 0.77 E9/L (ref 1.5–4)
LYMPHOCYTES RELATIVE PERCENT: 6.8 % (ref 20–42)
MCH RBC QN AUTO: 28.6 PG (ref 26–35)
MCHC RBC AUTO-ENTMCNC: 32.4 % (ref 32–34.5)
MCV RBC AUTO: 88.3 FL (ref 80–99.9)
MONOCYTES ABSOLUTE: 0.63 E9/L (ref 0.1–0.95)
MONOCYTES RELATIVE PERCENT: 5.5 % (ref 2–12)
NEUTROPHILS ABSOLUTE: 9.84 E9/L (ref 1.8–7.3)
NEUTROPHILS RELATIVE PERCENT: 86.3 % (ref 43–80)
PDW BLD-RTO: 13.3 FL (ref 11.5–15)
PLATELET # BLD: 438 E9/L (ref 130–450)
PMV BLD AUTO: 9.6 FL (ref 7–12)
POTASSIUM SERPL-SCNC: 4.4 MMOL/L (ref 3.5–5)
RBC # BLD: 4.96 E12/L (ref 3.8–5.8)
SODIUM BLD-SCNC: 138 MMOL/L (ref 132–146)
TOTAL PROTEIN: 5.9 G/DL (ref 6.4–8.3)
WBC # BLD: 11.4 E9/L (ref 4.5–11.5)

## 2020-12-15 PROCEDURE — 2580000003 HC RX 258: Performed by: INTERNAL MEDICINE

## 2020-12-15 PROCEDURE — 6360000002 HC RX W HCPCS: Performed by: INTERNAL MEDICINE

## 2020-12-15 PROCEDURE — 6370000000 HC RX 637 (ALT 250 FOR IP): Performed by: INTERNAL MEDICINE

## 2020-12-15 PROCEDURE — 2500000003 HC RX 250 WO HCPCS: Performed by: INTERNAL MEDICINE

## 2020-12-15 PROCEDURE — 85025 COMPLETE CBC W/AUTO DIFF WBC: CPT

## 2020-12-15 PROCEDURE — 2580000003 HC RX 258: Performed by: STUDENT IN AN ORGANIZED HEALTH CARE EDUCATION/TRAINING PROGRAM

## 2020-12-15 PROCEDURE — 2700000000 HC OXYGEN THERAPY PER DAY

## 2020-12-15 PROCEDURE — 80053 COMPREHEN METABOLIC PANEL: CPT

## 2020-12-15 PROCEDURE — 85378 FIBRIN DEGRADE SEMIQUANT: CPT

## 2020-12-15 PROCEDURE — 36415 COLL VENOUS BLD VENIPUNCTURE: CPT

## 2020-12-15 PROCEDURE — 2060000000 HC ICU INTERMEDIATE R&B

## 2020-12-15 RX ADMIN — DEXAMETHASONE 6 MG: 4 TABLET ORAL at 07:51

## 2020-12-15 RX ADMIN — DEXAMETHASONE 6 MG: 4 TABLET ORAL at 19:38

## 2020-12-15 RX ADMIN — ENOXAPARIN SODIUM 40 MG: 40 INJECTION SUBCUTANEOUS at 19:37

## 2020-12-15 RX ADMIN — SODIUM CHLORIDE, PRESERVATIVE FREE 10 ML: 5 INJECTION INTRAVENOUS at 07:51

## 2020-12-15 RX ADMIN — ENOXAPARIN SODIUM 40 MG: 40 INJECTION SUBCUTANEOUS at 07:51

## 2020-12-15 RX ADMIN — METOPROLOL SUCCINATE 25 MG: 25 TABLET, EXTENDED RELEASE ORAL at 07:51

## 2020-12-15 RX ADMIN — ASPIRIN 81 MG: 81 TABLET, COATED ORAL at 07:51

## 2020-12-15 RX ADMIN — SODIUM CHLORIDE: 9 INJECTION, SOLUTION INTRAVENOUS at 19:39

## 2020-12-15 RX ADMIN — SODIUM CHLORIDE, PRESERVATIVE FREE 10 ML: 5 INJECTION INTRAVENOUS at 07:52

## 2020-12-15 RX ADMIN — REMDESIVIR 100 MG: 100 INJECTION, POWDER, LYOPHILIZED, FOR SOLUTION INTRAVENOUS at 14:49

## 2020-12-15 RX ADMIN — Medication 2000 UNITS: at 07:51

## 2020-12-15 RX ADMIN — SODIUM CHLORIDE: 9 INJECTION, SOLUTION INTRAVENOUS at 07:53

## 2020-12-15 ASSESSMENT — PAIN SCALES - GENERAL
PAINLEVEL_OUTOF10: 0

## 2020-12-15 NOTE — CARE COORDINATION
CM NOTE: Per QFR---covid positive in droplet plus isolation. Day #4 IV RDV, continue decadron. Currently using O2 2L which he does not hav eat home. Independent in room---no therapy needs at this time. Declines HHC. CM & SW continue to follow pt.

## 2020-12-15 NOTE — PROGRESS NOTES
Subjective:    Patient seen and examined at bedside, improvement in shortness of breath. Mental status at baseline, no further confusion. Oxygen requirements decreasing    Objective:    /66   Pulse 63   Temp 98.1 °F (36.7 °C) (Oral)   Resp 18   Ht 6' (1.829 m)   Wt 230 lb (104.3 kg)   SpO2 94%   BMI 31.19 kg/m²     Current medications that patient is taking have been reviewed. Heart:  RRR, no murmurs, gallops, or rubs. Lungs:   Insp crackles  Abd: bowel sounds present, soft, nontender, nondistended, no masses  Extrem:  No cyanosis or edema    CBC:   Lab Results   Component Value Date    WBC 11.4 12/15/2020    RBC 4.96 12/15/2020    HGB 14.2 12/15/2020    HCT 43.8 12/15/2020    MCV 88.3 12/15/2020    MCH 28.6 12/15/2020    MCHC 32.4 12/15/2020    RDW 13.3 12/15/2020     12/15/2020    MPV 9.6 12/15/2020     CBC with Differential:    Lab Results   Component Value Date    WBC 11.4 12/15/2020    RBC 4.96 12/15/2020    HGB 14.2 12/15/2020    HCT 43.8 12/15/2020     12/15/2020    MCV 88.3 12/15/2020    MCH 28.6 12/15/2020    MCHC 32.4 12/15/2020    RDW 13.3 12/15/2020    LYMPHOPCT 6.8 12/15/2020    MONOPCT 5.5 12/15/2020    BASOPCT 0.3 12/15/2020    MONOSABS 0.63 12/15/2020    LYMPHSABS 0.77 12/15/2020    EOSABS 0.00 12/15/2020    BASOSABS 0.03 12/15/2020     BMP:    Lab Results   Component Value Date     12/15/2020    K 4.4 12/15/2020    K 4.3 07/22/2018     12/15/2020    CO2 29 12/15/2020    BUN 14 12/15/2020    LABALBU 2.8 12/15/2020    CREATININE 0.7 12/15/2020    CALCIUM 8.3 12/15/2020    GFRAA >60 12/15/2020    LABGLOM >60 12/15/2020    GLUCOSE 132 12/15/2020        Assessment:    Patient Active Problem List   Diagnosis    [facility]    Coronary artery disease    Cerebral infarction ([facility]    Hyperlipidemia with target LDL less than 100    History of coronary artery stent placement    Syncope and collapse  Acute respiratory failure due to COVID-19 [facility])       Plan:       1.  COVID-19 pneumonia              Continue with remdesivir (4/5) and dexamethasone (twice daily due to recent outpatient steroids), vitamin D3              Wean oxygen as tolerated  2.  Altered mental status-etiology unclear   Resolved              UA TSH ammonia unremarkable  3.  Hypertension-continue metoprolol  4.  DVT prophylaxis-Lovenox twice daily      Bam Ojeda    4:21 PM  12/15/2020

## 2020-12-16 LAB
ALBUMIN SERPL-MCNC: 2.4 G/DL (ref 3.5–5.2)
ALP BLD-CCNC: 57 U/L (ref 40–129)
ALT SERPL-CCNC: 38 U/L (ref 0–40)
ANION GAP SERPL CALCULATED.3IONS-SCNC: 6 MMOL/L (ref 7–16)
AST SERPL-CCNC: 24 U/L (ref 0–39)
BASOPHILS ABSOLUTE: 0.02 E9/L (ref 0–0.2)
BASOPHILS RELATIVE PERCENT: 0.2 % (ref 0–2)
BILIRUB SERPL-MCNC: 0.3 MG/DL (ref 0–1.2)
BUN BLDV-MCNC: 12 MG/DL (ref 8–23)
CALCIUM SERPL-MCNC: 8.1 MG/DL (ref 8.6–10.2)
CHLORIDE BLD-SCNC: 102 MMOL/L (ref 98–107)
CO2: 27 MMOL/L (ref 22–29)
CREAT SERPL-MCNC: 0.7 MG/DL (ref 0.7–1.2)
D DIMER: 551 NG/ML DDU
EOSINOPHILS ABSOLUTE: 0 E9/L (ref 0.05–0.5)
EOSINOPHILS RELATIVE PERCENT: 0 % (ref 0–6)
GFR AFRICAN AMERICAN: >60
GFR NON-AFRICAN AMERICAN: >60 ML/MIN/1.73
GLUCOSE BLD-MCNC: 150 MG/DL (ref 74–99)
HCT VFR BLD CALC: 42.6 % (ref 37–54)
HEMOGLOBIN: 13.7 G/DL (ref 12.5–16.5)
IMMATURE GRANULOCYTES #: 0.14 E9/L
IMMATURE GRANULOCYTES %: 1.3 % (ref 0–5)
LYMPHOCYTES ABSOLUTE: 0.66 E9/L (ref 1.5–4)
LYMPHOCYTES RELATIVE PERCENT: 6.1 % (ref 20–42)
MCH RBC QN AUTO: 28.4 PG (ref 26–35)
MCHC RBC AUTO-ENTMCNC: 32.2 % (ref 32–34.5)
MCV RBC AUTO: 88.2 FL (ref 80–99.9)
MONOCYTES ABSOLUTE: 0.43 E9/L (ref 0.1–0.95)
MONOCYTES RELATIVE PERCENT: 4 % (ref 2–12)
NEUTROPHILS ABSOLUTE: 9.51 E9/L (ref 1.8–7.3)
NEUTROPHILS RELATIVE PERCENT: 88.4 % (ref 43–80)
PDW BLD-RTO: 13.3 FL (ref 11.5–15)
PLATELET # BLD: 437 E9/L (ref 130–450)
PMV BLD AUTO: 9.4 FL (ref 7–12)
POTASSIUM SERPL-SCNC: 4.3 MMOL/L (ref 3.5–5)
RBC # BLD: 4.83 E12/L (ref 3.8–5.8)
SODIUM BLD-SCNC: 135 MMOL/L (ref 132–146)
TOTAL PROTEIN: 5.5 G/DL (ref 6.4–8.3)
WBC # BLD: 10.8 E9/L (ref 4.5–11.5)

## 2020-12-16 PROCEDURE — 80053 COMPREHEN METABOLIC PANEL: CPT

## 2020-12-16 PROCEDURE — 85025 COMPLETE CBC W/AUTO DIFF WBC: CPT

## 2020-12-16 PROCEDURE — 2500000003 HC RX 250 WO HCPCS: Performed by: INTERNAL MEDICINE

## 2020-12-16 PROCEDURE — 6360000002 HC RX W HCPCS: Performed by: INTERNAL MEDICINE

## 2020-12-16 PROCEDURE — 2700000000 HC OXYGEN THERAPY PER DAY

## 2020-12-16 PROCEDURE — 2580000003 HC RX 258: Performed by: INTERNAL MEDICINE

## 2020-12-16 PROCEDURE — 6370000000 HC RX 637 (ALT 250 FOR IP): Performed by: INTERNAL MEDICINE

## 2020-12-16 PROCEDURE — 36415 COLL VENOUS BLD VENIPUNCTURE: CPT

## 2020-12-16 PROCEDURE — 85378 FIBRIN DEGRADE SEMIQUANT: CPT

## 2020-12-16 PROCEDURE — 2060000000 HC ICU INTERMEDIATE R&B

## 2020-12-16 RX ORDER — LISINOPRIL 20 MG/1
20 TABLET ORAL DAILY
Status: DISCONTINUED | OUTPATIENT
Start: 2020-12-16 | End: 2020-12-17 | Stop reason: HOSPADM

## 2020-12-16 RX ADMIN — DEXAMETHASONE 6 MG: 4 TABLET ORAL at 19:53

## 2020-12-16 RX ADMIN — ENOXAPARIN SODIUM 40 MG: 40 INJECTION SUBCUTANEOUS at 19:52

## 2020-12-16 RX ADMIN — Medication 2000 UNITS: at 07:50

## 2020-12-16 RX ADMIN — SODIUM CHLORIDE: 9 INJECTION, SOLUTION INTRAVENOUS at 23:30

## 2020-12-16 RX ADMIN — ENOXAPARIN SODIUM 40 MG: 40 INJECTION SUBCUTANEOUS at 07:52

## 2020-12-16 RX ADMIN — ASPIRIN 81 MG: 81 TABLET, COATED ORAL at 07:50

## 2020-12-16 RX ADMIN — SODIUM CHLORIDE: 9 INJECTION, SOLUTION INTRAVENOUS at 10:14

## 2020-12-16 RX ADMIN — REMDESIVIR 100 MG: 100 INJECTION, POWDER, LYOPHILIZED, FOR SOLUTION INTRAVENOUS at 14:21

## 2020-12-16 RX ADMIN — DEXAMETHASONE 6 MG: 4 TABLET ORAL at 07:50

## 2020-12-16 RX ADMIN — LISINOPRIL 20 MG: 20 TABLET ORAL at 10:14

## 2020-12-16 ASSESSMENT — PAIN SCALES - GENERAL
PAINLEVEL_OUTOF10: 0

## 2020-12-16 NOTE — PROGRESS NOTES
Subjective:    Patient seen and examined at bedside, patient still getting short of breath with ambulation. Currently on 2 L via nasal cannula. Mental status at baseline    Objective:    BP (!) 151/77   Pulse 52   Temp 97.8 °F (36.6 °C) (Oral)   Resp 18   Ht 6' (1.829 m)   Wt 230 lb (104.3 kg)   SpO2 93%   BMI 31.19 kg/m²     Current medications that patient is taking have been reviewed. Heart:  RRR, no murmurs, gallops, or rubs.   Lungs:  CTA bilaterally, no wheeze, rales or rhonchi  Abd: bowel sounds present, soft, nontender, nondistended, no masses  Extrem:  No cyanosis or edema    CBC:   Lab Results   Component Value Date    WBC 10.8 12/16/2020    RBC 4.83 12/16/2020    HGB 13.7 12/16/2020    HCT 42.6 12/16/2020    MCV 88.2 12/16/2020    MCH 28.4 12/16/2020    MCHC 32.2 12/16/2020    RDW 13.3 12/16/2020     12/16/2020    MPV 9.4 12/16/2020     BMP:    Lab Results   Component Value Date     12/16/2020    K 4.3 12/16/2020    K 4.3 07/22/2018     12/16/2020    CO2 27 12/16/2020    BUN 12 12/16/2020    LABALBU 2.4 12/16/2020    CREATININE 0.7 12/16/2020    CALCIUM 8.1 12/16/2020    GFRAA >60 12/16/2020    LABGLOM >60 12/16/2020    GLUCOSE 150 12/16/2020        Assessment:    Patient Active Problem List   Diagnosis    Pulmonary [facility]bestosis ([facility] [address]    Coronary artery disease    Cerebral infarction ([facility] [address] [facility]    Hyperlipidemia with target LDL less than 100    History of coronary artery stent placement    Syncope and collapse    Acute respiratory failure due to COVID-19 [facility])       Plan:     1.  COVID-19 pneumonia              Continue with remdesivir (5/5) and dexamethasone (twice daily due to recent outpatient steroids), vitamin D3              Wean oxygen as tolerated  2.  Altered mental status-etiology unclear              Resolved              UA TSH ammonia unremarkable  3.  Hypertension-continue metoprolol  4.  DVT prophylaxis-Lovenox twice daily Disposition-likely discharge tomorrow with home oxygen    Ana Garcia    1:41 PM  12/16/2020

## 2020-12-17 VITALS
SYSTOLIC BLOOD PRESSURE: 132 MMHG | WEIGHT: 230 LBS | HEIGHT: 72 IN | OXYGEN SATURATION: 96 % | HEART RATE: 68 BPM | RESPIRATION RATE: 18 BRPM | BODY MASS INDEX: 31.15 KG/M2 | TEMPERATURE: 97.8 F | DIASTOLIC BLOOD PRESSURE: 68 MMHG

## 2020-12-17 LAB
ALBUMIN SERPL-MCNC: 2.7 G/DL (ref 3.5–5.2)
ALP BLD-CCNC: 61 U/L (ref 40–129)
ALT SERPL-CCNC: 54 U/L (ref 0–40)
ANION GAP SERPL CALCULATED.3IONS-SCNC: 6 MMOL/L (ref 7–16)
AST SERPL-CCNC: 29 U/L (ref 0–39)
BASOPHILS ABSOLUTE: 0.01 E9/L (ref 0–0.2)
BASOPHILS RELATIVE PERCENT: 0.1 % (ref 0–2)
BILIRUB SERPL-MCNC: 0.4 MG/DL (ref 0–1.2)
BLOOD CULTURE, ROUTINE: NORMAL
BUN BLDV-MCNC: 13 MG/DL (ref 8–23)
CALCIUM SERPL-MCNC: 8.5 MG/DL (ref 8.6–10.2)
CHLORIDE BLD-SCNC: 100 MMOL/L (ref 98–107)
CO2: 30 MMOL/L (ref 22–29)
CREAT SERPL-MCNC: 0.6 MG/DL (ref 0.7–1.2)
CULTURE, BLOOD 2: NORMAL
D DIMER: 513 NG/ML DDU
EOSINOPHILS ABSOLUTE: 0 E9/L (ref 0.05–0.5)
EOSINOPHILS RELATIVE PERCENT: 0 % (ref 0–6)
GFR AFRICAN AMERICAN: >60
GFR NON-AFRICAN AMERICAN: >60 ML/MIN/1.73
GLUCOSE BLD-MCNC: 143 MG/DL (ref 74–99)
HCT VFR BLD CALC: 45.3 % (ref 37–54)
HEMOGLOBIN: 14.6 G/DL (ref 12.5–16.5)
IMMATURE GRANULOCYTES #: 0.17 E9/L
IMMATURE GRANULOCYTES %: 1.7 % (ref 0–5)
LYMPHOCYTES ABSOLUTE: 0.88 E9/L (ref 1.5–4)
LYMPHOCYTES RELATIVE PERCENT: 8.6 % (ref 20–42)
MCH RBC QN AUTO: 28.4 PG (ref 26–35)
MCHC RBC AUTO-ENTMCNC: 32.2 % (ref 32–34.5)
MCV RBC AUTO: 88.1 FL (ref 80–99.9)
MONOCYTES ABSOLUTE: 0.53 E9/L (ref 0.1–0.95)
MONOCYTES RELATIVE PERCENT: 5.2 % (ref 2–12)
NEUTROPHILS ABSOLUTE: 8.67 E9/L (ref 1.8–7.3)
NEUTROPHILS RELATIVE PERCENT: 84.4 % (ref 43–80)
PDW BLD-RTO: 13.4 FL (ref 11.5–15)
PLATELET # BLD: 527 E9/L (ref 130–450)
PMV BLD AUTO: 9.4 FL (ref 7–12)
POTASSIUM SERPL-SCNC: 4.6 MMOL/L (ref 3.5–5)
RBC # BLD: 5.14 E12/L (ref 3.8–5.8)
SODIUM BLD-SCNC: 136 MMOL/L (ref 132–146)
TOTAL PROTEIN: 6.1 G/DL (ref 6.4–8.3)
WBC # BLD: 10.3 E9/L (ref 4.5–11.5)

## 2020-12-17 PROCEDURE — 85378 FIBRIN DEGRADE SEMIQUANT: CPT

## 2020-12-17 PROCEDURE — 6370000000 HC RX 637 (ALT 250 FOR IP): Performed by: INTERNAL MEDICINE

## 2020-12-17 PROCEDURE — 2700000000 HC OXYGEN THERAPY PER DAY

## 2020-12-17 PROCEDURE — 85025 COMPLETE CBC W/AUTO DIFF WBC: CPT

## 2020-12-17 PROCEDURE — 6360000002 HC RX W HCPCS: Performed by: INTERNAL MEDICINE

## 2020-12-17 PROCEDURE — 36415 COLL VENOUS BLD VENIPUNCTURE: CPT

## 2020-12-17 PROCEDURE — 80053 COMPREHEN METABOLIC PANEL: CPT

## 2020-12-17 RX ORDER — DEXAMETHASONE 6 MG/1
6 TABLET ORAL
Qty: 5 TABLET | Refills: 0 | Status: SHIPPED | OUTPATIENT
Start: 2020-12-17 | End: 2020-12-17

## 2020-12-17 RX ORDER — LISINOPRIL 20 MG/1
20 TABLET ORAL DAILY
Qty: 30 TABLET | Refills: 3 | Status: SHIPPED | OUTPATIENT
Start: 2020-12-18 | End: 2020-12-17

## 2020-12-17 RX ORDER — LISINOPRIL 20 MG/1
20 TABLET ORAL DAILY
Qty: 30 TABLET | Refills: 3 | Status: SHIPPED | OUTPATIENT
Start: 2020-12-18

## 2020-12-17 RX ORDER — CHOLECALCIFEROL (VITAMIN D3) 50 MCG
2000 TABLET ORAL DAILY
Qty: 30 TABLET | Refills: 0 | Status: SHIPPED | OUTPATIENT
Start: 2020-12-18 | End: 2021-05-11

## 2020-12-17 RX ORDER — DEXAMETHASONE 6 MG/1
6 TABLET ORAL
Qty: 5 TABLET | Refills: 0 | Status: SHIPPED | OUTPATIENT
Start: 2020-12-17 | End: 2020-12-22

## 2020-12-17 RX ORDER — CHOLECALCIFEROL (VITAMIN D3) 50 MCG
2000 TABLET ORAL DAILY
Qty: 30 TABLET | Refills: 0 | Status: SHIPPED | OUTPATIENT
Start: 2020-12-18 | End: 2020-12-17

## 2020-12-17 RX ADMIN — ENOXAPARIN SODIUM 40 MG: 40 INJECTION SUBCUTANEOUS at 07:52

## 2020-12-17 RX ADMIN — Medication 2000 UNITS: at 07:51

## 2020-12-17 RX ADMIN — LISINOPRIL 20 MG: 20 TABLET ORAL at 07:52

## 2020-12-17 RX ADMIN — METOPROLOL SUCCINATE 25 MG: 25 TABLET, EXTENDED RELEASE ORAL at 07:52

## 2020-12-17 RX ADMIN — ASPIRIN 81 MG: 81 TABLET, COATED ORAL at 07:52

## 2020-12-17 RX ADMIN — DEXAMETHASONE 6 MG: 4 TABLET ORAL at 07:51

## 2020-12-17 ASSESSMENT — PAIN SCALES - GENERAL: PAINLEVEL_OUTOF10: 0

## 2020-12-17 NOTE — PROGRESS NOTES
Notified Dr Jean Marie Rosenthal via perfect serve that pt is now on room air and did well with ambulating on RA also. Also asked if pt could be d/c today.

## 2020-12-17 NOTE — PROGRESS NOTES
Patient ambulated at RA    93% prior ambulation  97% during ambulation  95% at the end of ambulation

## 2020-12-17 NOTE — CARE COORDINATION
CM NOTE: Per QFR--- covid positive. Completed RDV. Remains on decadron bid & weaned down to O2 1L which he doesn't have at home. NO PREF for dme if needed. Plan is home ----possibly today. Declines need for HHC. Await pox testing to determine if pt qualifies for home oxygen. Will continue to follow.

## 2020-12-18 ENCOUNTER — CARE COORDINATION (OUTPATIENT)
Dept: CASE MANAGEMENT | Age: 65
End: 2020-12-18

## 2020-12-18 NOTE — ADT AUTH CERT
Patient Demographics    Name Patient ID SSN Gender Identity Birth Date   Batsheva Quezada [id number]  Male [birth year] (72 yrs)   Address Phone Email Employer    [address] [address] [address]   [address] [facility] [phone] (N) [phone] (R)   [phone] Billy [email or web address] ([employer]). [place] Race Occupation Emp Status    7 [employer]  Full Time    Reg Status PCP Date Last Verified Next Review Date    Verified Jose Enrique Lozano MD  [phone]    Admission Date Discharge Date Admitting Provider     20 Rebecca Vides MD     Marital Status Quaker       None      Emergency Contact 1 Emergency Contact [address] [phone]   [address] (A)   [phone] (A) [place])   [phone] [id number] (V)   [phone] Brandysegun Romero)   [address] [facility] [de-identified]  [address] [facility] Name/Sex/Relation Subscriber  Subscriber Address/Phone Subscriber Emp/Emp Phone   1.  Emely Latham   [id number] Sandy Felix - Male   (Self) 1955 [address] [id number] [phone](U) [phone](A) OTHER    Utilization Reviews       [facility] Illness, Acute - Care Day 5 (2020) by Ashley Villalta RN       Review Status Review Entered   Completed 2020 15:13      Criteria Review      Care Day: 5 Care Date: 2020 Level of Care: Intermediate Care    Guideline Day 3    Clinical Status    (X) * Hemodynamic stability    2020 3:13 PM EST by Presley Zhang      BP (!) 151/77   Pulse 52   Temp 97.8 °F (36.6 °C) (Oral)   Resp 18    (X) * Afebrile or temperature acceptable for next level of care    (X) * Tachypnea absent    (X) * Hypoxemia absent    2020 3:13 PM EST by Presley Zhang      on 2L    (X) * Mental status at baseline    (X) * Renal function at baseline, or stable and acceptable for next level of care    ( ) * Discharge plans and education understood    Activity ( ) * Ambulatory or acceptable for next level of care    Routes    (X) * Oral hydration [L]    ( ) * Oral medications or regimen acceptable for next level of care    (X) * Oral diet or acceptable for next level of care    12/17/2020 3:13 PM EST by Naomi Emerson      cardiac    Interventions    ( ) * Isolation not indicated, or is performable at next level of care [G] [H]    12/17/2020 3:13 PM EST by Naomi Emerson      droplet covid 19    (X) Pulse oximetry    12/17/2020 3:13 PM EST by Naomi Emerson      93% on 2L    Medications    ( ) * Antimicrobial medication absent or regimen established for next level of care    * Milestone   Additional Notes   12/16  Day 5  Intermediate care unit       patient still getting short of breath with ambulation.  Currently on 2 L via nasal cannula.  Mental status at baseline      MEDS   Asa 81mg qd   Decadron 6mg po bid   Lovenox 40mg sq bid   Lisinopril 20mg qd   Remdesivir 100mg ivpb qd   NS  @ 75 ml/hr      **Internal MD note**   Assessment:       Patient Active Problem List   Diagnosis   · Pulmonary asbestosis ([facility] [address]   · Coronary artery disease   · Cerebral infarction ([facility] [address]   · Hyperlipidemia with target LDL less than 100   · History of coronary artery stent placement   · Syncope and collapse   · Acute respiratory failure due to COVID-19 [facility])   Plan:    1.  COVID-19 pneumonia               Continue with remdesivir (5/5) and dexamethasone (twice daily due to recent outpatient steroids), vitamin D3               Wean oxygen as tolerated   2.  Altered mental status-etiology unclear               Resolved               UA TSH ammonia unremarkable   3.  Hypertension-continue metoprolol   4.  DVT prophylaxis-Lovenox twice daily         [facility] Illness, Acute - Care Day 3 (12/14/2020) by Michele Ward RN       Review Status Review Entered   Completed 12/17/2020 10:37      Criteria Review      Care Day: 3 Care Date: 12/14/2020 Level of Care: Intermediate Care    Guideline Day 2 Level Of Care    ( ) Floor    12/17/2020 10:37 AM EST by Dania Rodriguez      intermediate isolation unit    Clinical Status    (X) * Hypotension absent    12/17/2020 10:37 AM EST by Dania Rodriguez      97.6 (36.4)  18  59  136/67    (X) * No requirement for mechanical ventilation    12/17/2020 10:37 AM EST by Dania Rodriguez      no    (X) * Oxygenation at baseline or improved    12/17/2020 10:37 AM EST by Dania Rodriguez      improved> 93% 4L,weaned to 95% 2    (X) * Mental status at baseline    12/17/2020 10:37 AM EST by Dania Rodriguez      Mental status improved, does not remember much from the last few days    Activity    (X) Advance activity as tolerated    12/17/2020 10:37 AM EST by Dania Rodriguez      as [place]    Routes    (X) * Oral hydration    12/17/2020 10:37 AM EST by Dania Rodriguez      yes    (X) Oral or IV medications    12/17/2020 10:37 AM EST by Dania Rodriguez      both.  NS 75,asa 81 po qd, decadron 6mg po bid, lovenox 40 sq bid, toprol xl 25 po qd, remdesivir 100 iv qd, vit d po qd    (X) Usual diet    12/17/2020 10:37 AM EST by Dania Rodriguez      cardiac    Interventions    (X) Possible isolation    12/17/2020 10:37 AM EST by Dania Rodriguez      droplet plus iso    (X) Pulse oximetry    12/17/2020 10:37 AM EST by Dania Rodriguez      with vs and prn    (X) Possible oxygen    12/17/2020 10:37 AM EST by Dania Rodriguez      4L to 2L    Medications    (X) Possible antiviral medication    12/17/2020 10:37 AM EST by Dania Rodriguez      remdesivir 100 iv qd    ( ) Possible antibiotic (eg, for bacterial coinfection or superinfection)    12/17/2020 10:37 AM EST by Dania Rodriguez      no    ( ) Possible antipyretic medication    12/17/2020 10:37 AM EST by Danuta Hopkins none taken-tylenol    * Milestone   Additional Notes   12/14    IM Ccovid isolatin unit   + covid-19   Improved mentation   O2 weaned from 4L to 2L today      Remains on IV Remdesivir 100 qd, IV steroids         PCP-   ASSESS: feeling much better than day prior. Currently on 2L via NC. Mental status improved, does not remember much from the last few days. Lungs:  CTA bilaterally, no wheeze, rales or rhonchi   · Pulmonary asbestosis (HCC)   · Coronary artery disease   · Cerebral infarction (HCC)   · Hyperlipidemia with target LDL less than 100   · History of coronary artery stent placement   · Syncope and collapse   · Acute respiratory failure due to COVID-19 [facility])   Plan:   1.  COVID-19 pneumonia               Continue with remdesivir and dexamethasone (twice daily due to recent outpatient steroids), vitamin D3               Wean oxygen as tolerated   2.  Altered mental status-etiology unclear               UA TSH ammonia unremarkable   3.  Hypertension-continue metoprolol   4.  DVT prophylaxis-Lovenox twice daily            COVID-19/RESULT by Charu Cardona RN       Review Status Review Entered   In Primary 12/14/2020 15:18      Criteria Review   The illness suspected to be related to the Coronavirus (COVID-19)? Yes,   Has the member been tested for the COVID-19? Yes  If Yes, what are the results of the COVID-19? Positive,   SARS-CoV-2   Detected    11/30/2020           What is the severity of the members condition (i.e. Isolation, Ventilator use)?    4L NC  IM, droplet plus iso

## 2020-12-19 ENCOUNTER — CARE COORDINATION (OUTPATIENT)
Dept: CASE MANAGEMENT | Age: 65
End: 2020-12-19

## 2020-12-19 NOTE — CARE COORDINATION
Covid-19 2nd Outreach call, no answer.   Left VM with contact information and request  for return call at [address], [address] [place] [address] Coordination Transition

## 2020-12-27 NOTE — DISCHARGE SUMMARY
Physician Discharge Summary     Patient ID:  Angelia Castro  [id number]  72 y.o.  1955    Admit date: 12/12/2020    Discharge date and time:  12/17 4pm    Admission Diagnoses:   Patient Active Problem List   Diagnosis    [facility]bestosis ([facility] [address]    Coronary artery disease    Cerebral infarction ([facility]    Hyperlipidemia with target LDL less than 100    History of coronary artery stent placement    Syncope and collapse    Acute respiratory failure due to COVID-19 [facility])       Discharge Diagnoses: COVID 19 PNA    Consults: none    Procedures: None    Hospital Course:       1.  COVID-19 pneumonia   Patient treated with remdesivir dexamethasone Lovenox vitamin D3. Oxygen weaned. Completed 5-day course of remdesivir. On room air at time of discharge  2.  Altered mental status-etiology unclear              Resolved.  UA TSH ammonia unremarkable  3.  Hypertension-continue metoprolol  4.  DVT prophylaxis-Lovenox twice daily    Discharge Exam:  Gen - NAD AAOx3  CV - RRR s1/s2 no M/R/G  Pulm - Fair AE b/l no wheeze  Abd - soft NT ND  Ext - no LE edema      Condition:  Stable    Disposition: home    Patient Instructions:   Discharge Medication List as of 12/17/2020  3:26 PM      START taking these medications    Details   vitamin D (CHOLECALCIFEROL) 50 MCG (2000 UT) TABS tablet Take 1 tablet by mouth daily, Disp-30 tablet, R-0Normal      dexamethasone (DECADRON) 6 MG tablet Take 1 tablet by mouth daily (with breakfast) for 5 days, Disp-5 tablet, R-0Normal      lisinopril (PRINIVIL;ZESTRIL) 20 MG tablet Take 1 tablet by mouth daily, Disp-30 tablet, R-3Normal         CONTINUE these medications which have NOT CHANGED    Details   metoprolol succinate (TOPROL XL) 25 MG extended release tablet TAKE ONE TABLET BY MOUTH EVERY DAY, Disp-90 tablet,R-3Normal      nitroGLYCERIN (NITROSTAT) 0.4 MG SL tablet Place 1 tablet under the tongue every 5 minutes as needed for Chest pain, Disp-25 tablet, R-3 aspirin 81 MG tablet Take 81 mg by mouth daily. oxyCODONE-acetaminophen (PERCOCET)  MG per tablet Take 1 tablet by mouth every 6 hours as needed.            Activity: ambulate in house  Diet: cardiac diet    Follow-up with PCP in 2 weeks      Note that over 30 minutes was spent in preparing discharge papers, discussing discharge with patient, medication review, etc.    Signed:  Alpesh Han    12/27/2020  12:15 AM

## 2021-05-06 ENCOUNTER — TELEPHONE (OUTPATIENT)
Dept: CARDIOLOGY CLINIC | Age: 66
End: 2021-05-06

## 2021-05-06 NOTE — TELEPHONE ENCOUNTER
Patient notified of Dr. Barrios Cecilio recommendation. Symptoms are ongoing. F/U scheduled with Dr. Juliet Menjivar on 5/11/21 at 9:30 a.m. [address] [place] [address] notified.

## 2021-05-06 NOTE — TELEPHONE ENCOUNTER
Received a request from Dr. Kasandra Goodpasture to see patient re: CAD, LEÓN, possible echo/stress. Records scanned. Please review and advise.

## 2021-05-11 ENCOUNTER — OFFICE VISIT (OUTPATIENT)
Dept: CARDIOLOGY CLINIC | Age: 66
End: 2021-05-11
Payer: MEDICARE

## 2021-05-11 VITALS
HEART RATE: 81 BPM | RESPIRATION RATE: 16 BRPM | SYSTOLIC BLOOD PRESSURE: 154 MMHG | DIASTOLIC BLOOD PRESSURE: 80 MMHG | HEIGHT: 72 IN | WEIGHT: 240.4 LBS | BODY MASS INDEX: 32.56 KG/M2

## 2021-05-11 DIAGNOSIS — I25.10 CORONARY ARTERY DISEASE INVOLVING NATIVE CORONARY ARTERY OF NATIVE HEART WITHOUT ANGINA PECTORIS: Primary | Chronic | ICD-10-CM

## 2021-05-11 DIAGNOSIS — E78.49 OTHER HYPERLIPIDEMIA: ICD-10-CM

## 2021-05-11 PROBLEM — U07.1 ACUTE RESPIRATORY FAILURE DUE TO COVID-19 (HCC): Status: RESOLVED | Noted: 2020-12-12 | Resolved: 2021-05-11

## 2021-05-11 PROBLEM — J96.00 ACUTE RESPIRATORY FAILURE DUE TO COVID-19 (HCC): Status: RESOLVED | Noted: 2020-12-12 | Resolved: 2021-05-11

## 2021-05-11 PROBLEM — R55 SYNCOPE AND COLLAPSE: Status: RESOLVED | Noted: 2018-07-22 | Resolved: 2021-05-11

## 2021-05-11 PROCEDURE — 4040F PNEUMOC VAC/ADMIN/RCVD: CPT | Performed by: INTERNAL MEDICINE

## 2021-05-11 PROCEDURE — 4004F PT TOBACCO SCREEN RCVD TLK: CPT | Performed by: INTERNAL MEDICINE

## 2021-05-11 PROCEDURE — G8427 DOCREV CUR MEDS BY ELIG CLIN: HCPCS | Performed by: INTERNAL MEDICINE

## 2021-05-11 PROCEDURE — 1123F ACP DISCUSS/DSCN MKR DOCD: CPT | Performed by: INTERNAL MEDICINE

## 2021-05-11 PROCEDURE — 99215 OFFICE O/P EST HI 40 MIN: CPT | Performed by: INTERNAL MEDICINE

## 2021-05-11 PROCEDURE — 93000 ELECTROCARDIOGRAM COMPLETE: CPT | Performed by: INTERNAL MEDICINE

## 2021-05-11 PROCEDURE — 3017F COLORECTAL CA SCREEN DOC REV: CPT | Performed by: INTERNAL MEDICINE

## 2021-05-11 PROCEDURE — G8417 CALC BMI ABV UP PARAM F/U: HCPCS | Performed by: INTERNAL MEDICINE

## 2021-05-11 RX ORDER — MULTIVIT-MIN/IRON/FOLIC ACID/K 18-600-40
CAPSULE ORAL DAILY
COMMUNITY

## 2021-05-11 RX ORDER — ROSUVASTATIN CALCIUM 10 MG/1
10 TABLET, COATED ORAL DAILY
Qty: 30 TABLET | Refills: 5 | Status: SHIPPED
Start: 2021-05-11 | End: 2021-09-08

## 2021-05-11 RX ORDER — MONTELUKAST SODIUM 10 MG/1
TABLET ORAL
COMMUNITY
Start: 2021-05-02

## 2021-05-11 NOTE — PROGRESS NOTES
Chief Complaint   Patient presents with    Coronary Artery Disease    Shortness of Breath       Patient Active Problem List    Diagnosis Date Noted    Acute respiratory failure due to COVID-19 [facility]) 12/12/2020    Syncope and collapse 07/22/2018    History of coronary artery stent placement 09/22/2014    Hyperlipidemia with target LDL less than 100 06/05/2014     Overview Note:     replace inactive diagnosis      Cerebral infarction ([facility] [address] 06/04/2014     Overview Note:     Replacing Inactive Diagnoses      Coronary artery disease 08/28/2013     Overview Note:     70% LAD on cath 8/6/13 s/p 3.5 X 18 mm Resolute drug-eluting stent to proximal-mid LAD. Mild luminal irreg in RCA and CX      Pulmonary asbestosis ([facility] [address] 07/25/2013     Overview Note:     Pleural calcifications on pulmonary workup 2013 attributed to asbestosis per patient. Current Outpatient Medications   Medication Sig Dispense Refill    Ascorbic Acid (VITAMIN C) 500 MG CAPS Take by mouth daily      montelukast (SINGULAIR) 10 MG tablet       rosuvastatin (CRESTOR) 10 MG tablet Take 1 tablet by mouth daily 30 tablet 5    vitamin D (CHOLECALCIFEROL) 50 MCG (2000 UT) TABS tablet Take 1 tablet by mouth daily 30 tablet 0    lisinopril (PRINIVIL;ZESTRIL) 20 MG tablet Take 1 tablet by mouth daily 30 tablet 3    metoprolol succinate (TOPROL XL) 25 MG extended release tablet TAKE ONE TABLET BY MOUTH EVERY DAY 90 tablet 3    aspirin 81 MG tablet Take 81 mg by mouth daily.  oxyCODONE-acetaminophen (PERCOCET)  MG per tablet Take 1 tablet by mouth every 6 hours as needed.  nitroGLYCERIN (NITROSTAT) 0.4 MG SL tablet Place 1 tablet under the tongue every 5 minutes as needed for Chest pain (Patient not taking: Reported on 6/10/2020) 25 tablet 3     No current facility-administered medications for this visit.          No Known Allergies    Vitals:    05/11/21 0936   BP: (!) 154/80   Pulse: 81   Resp: 16   Weight: 240 lb 6.4 oz (109 kg) Height: 6' (1.829 m)       Social History     Socioeconomic History    Marital status:      Spouse name: Not on file    Number of children: Not on file    Years of education: Not on file    Highest education level: Not on file   Occupational History     Employer: [employer]   Social Needs    Financial resource strain: Not on file    Food insecurity     Worry: Not on file     Inability: Not on file    Transportation needs     Medical: Not on file     Non-medical: Not on file   Tobacco Use    Smoking status: Never Smoker    Smokeless tobacco: Current User     Types: Chew   Substance and Sexual Activity    Alcohol use: Yes     Alcohol/week: 4.0 standard drinks     Types: 4 Cans of beer per week     Comment: 4 beers per day    Drug use: No    Sexual activity: Not on file   Lifestyle    Physical activity     Days per week: Not on file     Minutes per session: Not on file    Stress: Not on file   Relationships    Social connections     Talks on phone: Not on file     Gets together: Not on file     Attends Yazidism service: Not on file     Active member of club or organization: Not on file     Attends meetings of clubs or organizations: Not on file     Relationship status: Not on file    Intimate partner violence     Fear of current or ex partner: Not on file     Emotionally abused: Not on file     Physically abused: Not on file     Forced sexual activity: Not on file   Other Topics Concern    Not on file   Social History Narrative    Not on file       Family History   Problem Relation Age of Onset    Heart Disease Father 54        CABG         SUBJECTIVE: Chandu Hunter presents to the office today for urgent re-evaluation of cardiac diagnoses. DR DICK[facility] PATIENT. I reviewed office notes back to 2013 and his admission late NOV 2020 for COVID pneumonia - no chf, no ACS, no rhythm issues.   Since then he has not felt well, and  He complains of dyspnea and denies   exertional chest pressure/discomfort, fatigue, irregular heart beat, lower extremity edema, near-syncope, orthopnea, palpitations, paroxysmal nocturnal dyspnea, syncope and tachypnea. Non smoker. Review of Systems:   Heart: as above   Lungs: as above   Eyes: denies changes in vision or discharge. Ears: denies changes in hearing or pain. Nose: denies epistaxis or masses   Throat: denies sore throat or trouble swallowing. Neuro: denies numbness, tingling, tremors. Skin: denies rashes or itching. : denies hematuria, dysuria   GI: denies vomiting, diarrhea   Psych: denies mood changed, anxiety, depression. all others negative. Physical Exam   BP (!) 154/80   Pulse 81   Resp 16   Ht 6' (1.829 m)   Wt 240 lb 6.4 oz (109 kg)   BMI 32.60 kg/m²   Constitutional: Oriented to person, place, and time. Well-developed and well-nourished. No distress. Head: Normocephalic and atraumatic. Eyes: EOM are normal. Pupils are equal, round, and reactive to light. Neck: Normal range of motion. Neck supple. No hepatojugular reflux and no JVD present. Carotid bruit is not present. No tracheal deviation present. No thyromegaly present. Cardiovascular: Normal rate, regular rhythm, normal heart sounds and intact distal pulses. Exam reveals no gallop and no friction rub. No murmur heard. Pulmonary/Chest: Effort normal and breath sounds normal. No respiratory distress. No wheezes. No rales. No tenderness. Abdominal: Soft. Bowel sounds are normal. No distension and no mass. No tenderness. No rebound and no guarding. Musculoskeletal: Normal range of motion. No edema and no tenderness. Neurological: Alert and oriented to person, place, and time. Skin: Skin is warm and dry. No rash noted. Not diaphoretic. No erythema. Psychiatric: Normal mood and affect. Behavior is normal.     EKG:  normal sinus rhythm, occasional PVC noted, unifocal, rate 81, axis -32.     ASSESSMENT AND PLAN:  Patient Active Problem List Diagnosis    [facility]monary [facility]bestosis ([facility] [address]    Coronary artery disease    Cerebral infarction ([facility] [address]    Hyperlipidemia with target LDL less than 100    History of coronary artery stent placement    Syncope and collapse    Acute respiratory failure due to COVID-19 [facility])     Patient with remote hx of KRIS to proximal LAD, no other obstructive lesions at time of cath 2013  Last stress 2014 - no ischemia  Echo 2018 no structural disease  Hx of undocumented intolerance to simvastatin, long discussion today about need to attempt another statin given his known vascular disease - described at length indication, benefit, and symptoms.  Will given him a hydrophilic statin - rosuvastatin 10 mg qd and titrate up to achieve LDL < 70, or until side effects are noted  His SOB could be progressive CAD, or lingering effects of COVID pneumonia on top of pre-existing asbestosis  No sign of chf or valve disease on exam  Exercise nuclear ordered    Manisha Reaves M.D  [facility]

## 2021-06-07 ENCOUNTER — HOSPITAL ENCOUNTER (OUTPATIENT)
Dept: CARDIOLOGY | Age: 66
Discharge: HOME | End: 2021-06-07
Payer: MEDICARE

## 2021-06-07 ENCOUNTER — TELEPHONE (OUTPATIENT)
Dept: CARDIOLOGY CLINIC | Age: 66
End: 2021-06-07

## 2021-06-07 VITALS
HEIGHT: 72 IN | BODY MASS INDEX: 32.51 KG/M2 | OXYGEN SATURATION: 96 % | WEIGHT: 240 LBS | SYSTOLIC BLOOD PRESSURE: 132 MMHG | DIASTOLIC BLOOD PRESSURE: 82 MMHG | HEART RATE: 80 BPM

## 2021-06-07 DIAGNOSIS — I25.10 CORONARY ARTERY DISEASE INVOLVING NATIVE CORONARY ARTERY OF NATIVE HEART WITHOUT ANGINA PECTORIS: ICD-10-CM

## 2021-06-07 DIAGNOSIS — I25.10 CORONARY ARTERY DISEASE INVOLVING NATIVE CORONARY ARTERY OF NATIVE HEART WITHOUT ANGINA PECTORIS: Primary | Chronic | ICD-10-CM

## 2021-06-07 LAB
LV EF: 72 %
LVEF MODALITY: NORMAL

## 2021-06-07 PROCEDURE — A9500 TC99M SESTAMIBI: HCPCS | Performed by: INTERNAL MEDICINE

## 2021-06-07 PROCEDURE — 78452 HT MUSCLE IMAGE SPECT MULT: CPT

## 2021-06-07 PROCEDURE — 3430000000 HC RX DIAGNOSTIC RADIOPHARMACEUTICAL: Performed by: INTERNAL MEDICINE

## 2021-06-07 PROCEDURE — 2580000003 HC RX 258: Performed by: INTERNAL MEDICINE

## 2021-06-07 PROCEDURE — 93017 CV STRESS TEST TRACING ONLY: CPT

## 2021-06-07 RX ORDER — SODIUM CHLORIDE 9 MG/ML
25 INJECTION, SOLUTION INTRAVENOUS PRN
Status: DISCONTINUED | OUTPATIENT
Start: 2021-06-07 | End: 2021-06-08 | Stop reason: HOSPADM

## 2021-06-07 RX ORDER — SODIUM CHLORIDE 0.9 % (FLUSH) 0.9 %
10 SYRINGE (ML) INJECTION PRN
Status: DISCONTINUED | OUTPATIENT
Start: 2021-06-07 | End: 2021-06-08 | Stop reason: HOSPADM

## 2021-06-07 RX ADMIN — Medication 32.2 MILLICURIE: at 09:03

## 2021-06-07 RX ADMIN — Medication 10.1 MILLICURIE: at 07:19

## 2021-06-07 RX ADMIN — SODIUM CHLORIDE, PRESERVATIVE FREE 10 ML: 5 INJECTION INTRAVENOUS at 09:03

## 2021-06-07 RX ADMIN — SODIUM CHLORIDE, PRESERVATIVE FREE 10 ML: 5 INJECTION INTRAVENOUS at 07:19

## 2021-06-07 NOTE — TELEPHONE ENCOUNTER
----- Message from Va Campos MD sent at 6/7/2021 12:31 PM EDT -----  Carlitos Menjivar normal  F/u prn with dr Hammad Mas ( his patinet)    ----- Message -----  From: Miguel Sarah  Sent: 6/7/2021  11:24 AM EDT  To: Va Campos MD    Please advise on stress test results

## 2021-06-07 NOTE — PROCEDURES
[address] [facility] [address] [address] [address]  [phone]                Exercise Stress Nuclear Gated SPECT Study    Name: Tay Kingsley Account Number: [de-identified]    :  1955      Sex: male              Date of Study:  2021    Height: 6' (182.9 cm)  Weight: 240 lb (108.9 kg)     Ordering Provider: Diana Diaz. Loyda Gardner MD          PCP: Ksenia Castro MD      Cardiologist: Diana Diaz. Loyda Gardner MD                        Interpreting Physician: Jossue Gardner MD  _________________________________________________________________________________    Indication:   Evaluation of extent and severity of coronary artery disease    Clinical History:   Patient has prior history of coronary artery disease. Exercise: The patient exercised using a Micah protocol, completing 5.00 minutes and reaching an estimated work load of 7.0 metabolic equivalents (METS). Resting HR was 80. Peak exercise heart rate was 158 ( 102% of maximum predicted heart rate for age). Baseline /82. Peak exercise /86. The blood pressure response to exercise was normal      Exercise was terminated due to dyspnea, fatigue and heart rate attained. The patient experienced no chest pain with exercise. Pulse oximetry was used to monitor oxygen saturation during the stress test.  The study was performed on Room Air. The resting pulse oximeter was 96%. The lowest O2 saturation seen during exercise was 93 %. The average O2 saturation with exercise was 94.5 %. Exercise ECG:   The patient demonstrated isolated PVC's during exercise. With exercise, there were no ST segment changes of significance at the heart rate achieved. IMAGING: Myocardial perfusion imaging was performed at rest 30-35 minutes following the intravenous injection of 10.1 mCi of (Tc-Sestamibi) followed by 10 ml of Normal Saline.   At peak exercise, the patient was injected intravenously with 32.2 mCi of (Tc-Sestamibi) followed by 10 ml of Normal Saline. Gated post-stress tomographic imaging was performed 20-25 minutes after stress. FINDINGS: The overall quality of the study was excellent. Left ventricular cavity size was noted to be normal.    Rotational analog analysis demonstrated no patient motion or abnormal extracardiac radioactivity. The gated SPECT stress imaging in the short, vertical long, and horizontal long axis demonstrated normal homogeneous tracer distribution throughout the myocardium. The resting images are normal.     Gated SPECT left ventricular ejection fraction was calculated to be 72%, with normal myocardial thickening and wall motion. Impression:    1. Exercise EKG was negative. 2. The patient experienced no chest pain with exercise. 3. The myocardial perfusion imaging was normal.    4. Overall left ventricular systolic function was normal without regional wall motion abnormalities. 5. Exercise capacity was average. 6. Low risk general exercise nuclear treadmill test.    Thank you for sending your patient to this [employer] [facility].      Electronically signed by Andrea Clayton MD on 6/7/21 at 11:00 AM EDT

## 2021-09-02 ENCOUNTER — TELEPHONE (OUTPATIENT)
Dept: CARDIOLOGY | Age: 66
End: 2021-09-02

## 2021-09-02 NOTE — TELEPHONE ENCOUNTER
CALLED PATIENT TO SCHEDULE ECHO FOR 09-27-21 PATIENT WANTED TO SCHEDULE ON THAT DAY. NO AUTH NEEDED PER DR. CADENA'S OFFICE   REVIEWED COVID CHECKLIST WITH PATIENT.     Electronically signed by Mo Pearson on 9/2/2021 at 1:58 PM

## 2021-09-08 RX ORDER — ROSUVASTATIN CALCIUM 10 MG/1
TABLET, COATED ORAL
Qty: 90 TABLET | Refills: 0 | Status: SHIPPED
Start: 2021-09-08 | End: 2021-11-11

## 2021-09-27 ENCOUNTER — HOSPITAL ENCOUNTER (OUTPATIENT)
Dept: CARDIOLOGY | Age: 66
Discharge: HOME | End: 2021-09-27
Payer: MEDICARE

## 2021-09-27 LAB
LV EF: 63 %
LVEF MODALITY: NORMAL

## 2021-09-27 PROCEDURE — 93306 TTE W/DOPPLER COMPLETE: CPT | Performed by: PSYCHIATRY & NEUROLOGY

## 2021-11-11 RX ORDER — ROSUVASTATIN CALCIUM 10 MG/1
TABLET, COATED ORAL
Qty: 60 TABLET | Refills: 5 | Status: SHIPPED
Start: 2021-11-11 | End: 2022-10-10

## 2021-11-15 DIAGNOSIS — I25.10 CORONARY ARTERY DISEASE INVOLVING NATIVE CORONARY ARTERY OF NATIVE HEART WITHOUT ANGINA PECTORIS: Chronic | ICD-10-CM

## 2021-11-15 DIAGNOSIS — Z95.5 HISTORY OF CORONARY ARTERY STENT PLACEMENT: ICD-10-CM

## 2021-11-16 RX ORDER — METOPROLOL SUCCINATE 25 MG/1
TABLET, EXTENDED RELEASE ORAL
Qty: 90 TABLET | Refills: 3 | Status: SHIPPED | OUTPATIENT
Start: 2021-11-16

## 2022-10-10 RX ORDER — ROSUVASTATIN CALCIUM 10 MG/1
TABLET, COATED ORAL
Qty: 90 TABLET | Refills: 3 | Status: SHIPPED | OUTPATIENT
Start: 2022-10-10

## 2022-12-12 DIAGNOSIS — I25.10 CORONARY ARTERY DISEASE INVOLVING NATIVE CORONARY ARTERY OF NATIVE HEART WITHOUT ANGINA PECTORIS: Chronic | ICD-10-CM

## 2022-12-12 DIAGNOSIS — Z95.5 HISTORY OF CORONARY ARTERY STENT PLACEMENT: ICD-10-CM

## 2022-12-12 RX ORDER — METOPROLOL SUCCINATE 25 MG/1
TABLET, EXTENDED RELEASE ORAL
Qty: 90 TABLET | Refills: 0 | Status: SHIPPED | OUTPATIENT
Start: 2022-12-12

## 2023-02-07 RX ORDER — ROSUVASTATIN CALCIUM 10 MG/1
TABLET, COATED ORAL
Qty: 90 TABLET | Refills: 0 | Status: SHIPPED | OUTPATIENT
Start: 2023-02-07

## 2024-09-16 ENCOUNTER — HOSPITAL ENCOUNTER (OUTPATIENT)
Age: 69
Discharge: HOME | End: 2024-09-18
Payer: MEDICARE

## 2024-09-16 ENCOUNTER — HOSPITAL ENCOUNTER (OUTPATIENT)
Dept: GENERAL RADIOLOGY | Age: 69
Discharge: HOME | End: 2024-09-18
Attending: FAMILY MEDICINE
Payer: MEDICARE

## 2024-09-16 DIAGNOSIS — R05.9 COMPLAINING OF COUGH: ICD-10-CM

## 2024-09-16 PROCEDURE — 71046 X-RAY EXAM CHEST 2 VIEWS: CPT

## 2024-09-20 ENCOUNTER — TELEPHONE (OUTPATIENT)
Dept: CARDIOLOGY | Age: 69
End: 2024-09-20

## 2024-09-24 ENCOUNTER — HOSPITAL ENCOUNTER (OUTPATIENT)
Dept: CARDIOLOGY | Age: 69
Discharge: HOME | End: 2024-09-26
Payer: MEDICARE

## 2024-09-24 VITALS
WEIGHT: 245 LBS | HEIGHT: 72 IN | SYSTOLIC BLOOD PRESSURE: 130 MMHG | BODY MASS INDEX: 33.18 KG/M2 | DIASTOLIC BLOOD PRESSURE: 70 MMHG | HEART RATE: 92 BPM | RESPIRATION RATE: 14 BRPM

## 2024-09-24 VITALS — BODY MASS INDEX: 32.51 KG/M2 | HEIGHT: 72 IN | WEIGHT: 240 LBS

## 2024-09-24 DIAGNOSIS — R06.02 SHORTNESS OF BREATH: ICD-10-CM

## 2024-09-24 DIAGNOSIS — R07.9 CHEST PAIN, UNSPECIFIED TYPE: Primary | ICD-10-CM

## 2024-09-24 DIAGNOSIS — R07.9 CHEST PAIN, UNSPECIFIED TYPE: ICD-10-CM

## 2024-09-24 LAB
ECHO AO ASC DIAM: 2.8 CM
ECHO AO ASCENDING AORTA INDEX: 1.22 CM/M2
ECHO AV AREA PEAK VELOCITY: 1.7 CM2
ECHO AV AREA VTI: 2.1 CM2
ECHO AV AREA/BSA PEAK VELOCITY: 0.7 CM2/M2
ECHO AV AREA/BSA VTI: 0.9 CM2/M2
ECHO AV CUSP MM: 1.5 CM
ECHO AV MEAN GRADIENT: 10 MMHG
ECHO AV MEAN VELOCITY: 1.5 M/S
ECHO AV PEAK GRADIENT: 18 MMHG
ECHO AV PEAK VELOCITY: 2.1 M/S
ECHO AV VELOCITY RATIO: 0.57
ECHO AV VTI: 36.4 CM
ECHO BSA: 2.35 M2
ECHO BSA: 2.38 M2
ECHO EST RA PRESSURE: 3 MMHG
ECHO LA DIAMETER INDEX: 1.61 CM/M2
ECHO LA DIAMETER: 3.7 CM
ECHO LA VOL A-L A2C: 61 ML (ref 18–58)
ECHO LA VOL A-L A4C: 43 ML (ref 18–58)
ECHO LA VOL MOD A2C: 60 ML (ref 18–58)
ECHO LA VOL MOD A4C: 41 ML (ref 18–58)
ECHO LA VOLUME AREA LENGTH: 53 ML
ECHO LA VOLUME INDEX A-L A2C: 27 ML/M2 (ref 16–34)
ECHO LA VOLUME INDEX A-L A4C: 19 ML/M2 (ref 16–34)
ECHO LA VOLUME INDEX AREA LENGTH: 23 ML/M2 (ref 16–34)
ECHO LA VOLUME INDEX MOD A2C: 26 ML/M2 (ref 16–34)
ECHO LA VOLUME INDEX MOD A4C: 18 ML/M2 (ref 16–34)
ECHO LV EF PHYSICIAN: 60 %
ECHO LV FRACTIONAL SHORTENING: 34 % (ref 28–44)
ECHO LV INTERNAL DIMENSION DIASTOLE INDEX: 1.91 CM/M2
ECHO LV INTERNAL DIMENSION DIASTOLIC: 4.4 CM (ref 4.2–5.9)
ECHO LV INTERNAL DIMENSION SYSTOLIC INDEX: 1.26 CM/M2
ECHO LV INTERNAL DIMENSION SYSTOLIC: 2.9 CM
ECHO LV ISOVOLUMETRIC RELAXATION TIME (IVRT): 76.1 MS
ECHO LV IVSD: 1.3 CM (ref 0.6–1)
ECHO LV IVSS: 1.7 CM
ECHO LV MASS 2D: 191.3 G (ref 88–224)
ECHO LV MASS INDEX 2D: 83.2 G/M2 (ref 49–115)
ECHO LV POSTERIOR WALL DIASTOLIC: 1.1 CM (ref 0.6–1)
ECHO LV POSTERIOR WALL SYSTOLIC: 1.5 CM
ECHO LV RELATIVE WALL THICKNESS RATIO: 0.5
ECHO LVOT AREA: 3.1 CM2
ECHO LVOT AV VTI INDEX: 0.69
ECHO LVOT DIAM: 2 CM
ECHO LVOT MEAN GRADIENT: 3 MMHG
ECHO LVOT PEAK GRADIENT: 6 MMHG
ECHO LVOT PEAK VELOCITY: 1.2 M/S
ECHO LVOT STROKE VOLUME INDEX: 34.1 ML/M2
ECHO LVOT SV: 78.5 ML
ECHO LVOT VTI: 25 CM
ECHO MV "A" WAVE DURATION: 152.3 MSEC
ECHO MV A VELOCITY: 1.24 M/S
ECHO MV AREA PHT: 2.7 CM2
ECHO MV AREA VTI: 2.8 CM2
ECHO MV E DECELERATION TIME (DT): 250.8 MS
ECHO MV E VELOCITY: 0.96 M/S
ECHO MV E/A RATIO: 0.77
ECHO MV LVOT VTI INDEX: 1.14
ECHO MV MAX VELOCITY: 1.3 M/S
ECHO MV MEAN GRADIENT: 3 MMHG
ECHO MV MEAN VELOCITY: 0.9 M/S
ECHO MV PEAK GRADIENT: 6 MMHG
ECHO MV PRESSURE HALF TIME (PHT): 80.8 MS
ECHO MV VTI: 28.4 CM
ECHO PV MAX VELOCITY: 1.2 M/S
ECHO PV MEAN GRADIENT: 3 MMHG
ECHO PV MEAN VELOCITY: 0.9 M/S
ECHO PV PEAK GRADIENT: 6 MMHG
ECHO PV VTI: 23 CM
ECHO PVEIN A DURATION: 103.8 MS
ECHO PVEIN A VELOCITY: 0.3 M/S
ECHO PVEIN PEAK D VELOCITY: 0.4 M/S
ECHO PVEIN PEAK S VELOCITY: 0.6 M/S
ECHO PVEIN S/D RATIO: 1.5
ECHO RIGHT VENTRICULAR SYSTOLIC PRESSURE (RVSP): 24 MMHG
ECHO RV INTERNAL DIMENSION: 3 CM
ECHO RV TAPSE: 2.5 CM (ref 1.7–?)
ECHO TV REGURGITANT MAX VELOCITY: 2.27 M/S
ECHO TV REGURGITANT PEAK GRADIENT: 21 MMHG
NUC STRESS EJECTION FRACTION: 79 %
STRESS BASELINE DIAS BP: 70 MMHG
STRESS BASELINE HR: 90 BPM
STRESS BASELINE ST DEPRESSION: 0 MM
STRESS BASELINE SYS BP: 130 MMHG
STRESS ESTIMATED WORKLOAD: 1.1 METS
STRESS PEAK DIAS BP: 64 MMHG
STRESS PEAK SYS BP: 120 MMHG
STRESS PERCENT HR ACHIEVED: 84 %
STRESS POST PEAK HR: 127 BPM
STRESS RATE PRESSURE PRODUCT: NORMAL BPM*MMHG
STRESS ST DEPRESSION: 0 MM
STRESS TARGET HR: 152 BPM

## 2024-09-24 PROCEDURE — 6360000002 HC RX W HCPCS: Performed by: INTERNAL MEDICINE

## 2024-09-24 PROCEDURE — A9500 TC99M SESTAMIBI: HCPCS | Performed by: INTERNAL MEDICINE

## 2024-09-24 PROCEDURE — 2580000003 HC RX 258: Performed by: INTERNAL MEDICINE

## 2024-09-24 PROCEDURE — 93306 TTE W/DOPPLER COMPLETE: CPT

## 2024-09-24 PROCEDURE — 3430000000 HC RX DIAGNOSTIC RADIOPHARMACEUTICAL: Performed by: INTERNAL MEDICINE

## 2024-09-24 PROCEDURE — 93017 CV STRESS TEST TRACING ONLY: CPT

## 2024-09-24 RX ORDER — TETRAKIS(2-METHOXYISOBUTYLISOCYANIDE)COPPER(I) TETRAFLUOROBORATE 1 MG/ML
10 INJECTION, POWDER, LYOPHILIZED, FOR SOLUTION INTRAVENOUS
Status: COMPLETED | OUTPATIENT
Start: 2024-09-24 | End: 2024-09-24

## 2024-09-24 RX ORDER — REGADENOSON 0.08 MG/ML
0.4 INJECTION, SOLUTION INTRAVENOUS
Status: COMPLETED | OUTPATIENT
Start: 2024-09-24 | End: 2024-09-24

## 2024-09-24 RX ORDER — SODIUM CHLORIDE 0.9 % (FLUSH) 0.9 %
10 SYRINGE (ML) INJECTION PRN
Status: DISCONTINUED | OUTPATIENT
Start: 2024-09-24 | End: 2024-09-27 | Stop reason: HOSPADM

## 2024-09-24 RX ORDER — TETRAKIS(2-METHOXYISOBUTYLISOCYANIDE)COPPER(I) TETRAFLUOROBORATE 1 MG/ML
32.6 INJECTION, POWDER, LYOPHILIZED, FOR SOLUTION INTRAVENOUS
Status: COMPLETED | OUTPATIENT
Start: 2024-09-24 | End: 2024-09-24

## 2024-09-24 RX ADMIN — SODIUM CHLORIDE, PRESERVATIVE FREE 10 ML: 5 INJECTION INTRAVENOUS at 08:44

## 2024-09-24 RX ADMIN — Medication 10 MILLICURIE: at 07:13

## 2024-09-24 RX ADMIN — Medication 32.6 MILLICURIE: at 08:43

## 2024-09-24 RX ADMIN — SODIUM CHLORIDE, PRESERVATIVE FREE 10 ML: 5 INJECTION INTRAVENOUS at 07:13

## 2024-09-24 RX ADMIN — SODIUM CHLORIDE, PRESERVATIVE FREE 10 ML: 5 INJECTION INTRAVENOUS at 08:43

## 2024-09-24 RX ADMIN — REGADENOSON 0.4 MG: 0.08 INJECTION, SOLUTION INTRAVENOUS at 08:43

## 2024-10-08 ENCOUNTER — HOSPITAL ENCOUNTER (OUTPATIENT)
Dept: CT IMAGING | Age: 69
Discharge: HOME | End: 2024-10-10
Payer: MEDICARE

## 2024-10-08 DIAGNOSIS — Z77.090 CONTACT WITH AND (SUSPECTED) EXPOSURE TO ASBESTOS: ICD-10-CM

## 2024-10-08 PROCEDURE — 6360000004 HC RX CONTRAST MEDICATION: Performed by: RADIOLOGY

## 2024-10-08 PROCEDURE — 71260 CT THORAX DX C+: CPT

## 2024-10-08 PROCEDURE — 2580000003 HC RX 258: Performed by: RADIOLOGY

## 2024-10-08 RX ORDER — SODIUM CHLORIDE 0.9 % (FLUSH) 0.9 %
5-40 SYRINGE (ML) INJECTION 2 TIMES DAILY
Status: DISCONTINUED | OUTPATIENT
Start: 2024-10-08 | End: 2024-10-11 | Stop reason: HOSPADM

## 2024-10-08 RX ORDER — IOPAMIDOL 755 MG/ML
75 INJECTION, SOLUTION INTRAVASCULAR
Status: COMPLETED | OUTPATIENT
Start: 2024-10-08 | End: 2024-10-08

## 2024-10-08 RX ADMIN — SODIUM CHLORIDE, PRESERVATIVE FREE 10 ML: 5 INJECTION INTRAVENOUS at 14:35

## 2024-10-08 RX ADMIN — IOPAMIDOL 75 ML: 755 INJECTION, SOLUTION INTRAVENOUS at 14:34
